# Patient Record
Sex: FEMALE | URBAN - METROPOLITAN AREA
[De-identification: names, ages, dates, MRNs, and addresses within clinical notes are randomized per-mention and may not be internally consistent; named-entity substitution may affect disease eponyms.]

---

## 2021-11-03 LAB
C. TRACHOMATIS, EXTERNAL RESULT: NEGATIVE
HEP B, EXTERNAL RESULT: NEGATIVE
HIV, EXTERNAL RESULT: NON REACTIVE
N. GONORRHOEAE, EXTERNAL RESULT: NEGATIVE
RPR, EXTERNAL RESULT: NON REACTIVE
RUBELLA TITER, EXTERNAL RESULT: NORMAL

## 2022-04-29 LAB — GBS, EXTERNAL RESULT: NEGATIVE

## 2022-05-16 ENCOUNTER — ANESTHESIA (OUTPATIENT)
Dept: OPERATING ROOM | Age: 32
End: 2022-05-16
Payer: COMMERCIAL

## 2022-05-16 ENCOUNTER — ANESTHESIA EVENT (OUTPATIENT)
Dept: LABOR AND DELIVERY | Age: 32
End: 2022-05-16

## 2022-05-16 ENCOUNTER — ANESTHESIA EVENT (OUTPATIENT)
Dept: OPERATING ROOM | Age: 32
End: 2022-05-16
Payer: COMMERCIAL

## 2022-05-16 ENCOUNTER — APPOINTMENT (OUTPATIENT)
Dept: LABOR AND DELIVERY | Age: 32
End: 2022-05-16
Payer: COMMERCIAL

## 2022-05-16 ENCOUNTER — ANESTHESIA (OUTPATIENT)
Dept: LABOR AND DELIVERY | Age: 32
End: 2022-05-16

## 2022-05-16 ENCOUNTER — HOSPITAL ENCOUNTER (INPATIENT)
Age: 32
LOS: 2 days | Discharge: HOME | End: 2022-05-18
Attending: OBSTETRICS & GYNECOLOGY | Admitting: OBSTETRICS & GYNECOLOGY
Payer: COMMERCIAL

## 2022-05-16 PROBLEM — Z78.9 ADMITTED TO LABOR AND DELIVERY: Status: ACTIVE | Noted: 2022-05-16

## 2022-05-16 LAB
ABO/RH: NORMAL
AMPHETAMINE SCREEN, URINE: NORMAL
ANTIBODY SCREEN: NORMAL
BACTERIA: ABNORMAL /HPF
BARBITURATE SCREEN URINE: NORMAL
BASOPHILS ABSOLUTE: 0 K/UL (ref 0–0.2)
BASOPHILS RELATIVE PERCENT: 0.2 %
BENZODIAZEPINE SCREEN, URINE: NORMAL
BILIRUBIN URINE: NEGATIVE
BLOOD, URINE: NEGATIVE
CANNABINOID SCREEN URINE: NORMAL
CLARITY: CLEAR
COCAINE METABOLITE SCREEN URINE: NORMAL
COLOR: YELLOW
EOSINOPHILS ABSOLUTE: 0.1 K/UL (ref 0–0.7)
EOSINOPHILS RELATIVE PERCENT: 0.7 %
EPITHELIAL CELLS, UA: ABNORMAL /HPF (ref 0–5)
GLUCOSE URINE: NEGATIVE MG/DL
HCT VFR BLD CALC: 33.5 % (ref 37–47)
HEMOGLOBIN: 11 G/DL (ref 12–16)
HEPATITIS B SURFACE ANTIGEN INTERPRETATION: NORMAL
HYALINE CASTS: ABNORMAL /HPF (ref 0–5)
KETONES, URINE: 40 MG/DL
LEUKOCYTE ESTERASE, URINE: ABNORMAL
LYMPHOCYTES ABSOLUTE: 0.9 K/UL (ref 1–4.8)
LYMPHOCYTES RELATIVE PERCENT: 11.1 %
Lab: NORMAL
MCH RBC QN AUTO: 30.4 PG (ref 27–31.3)
MCHC RBC AUTO-ENTMCNC: 33 % (ref 33–37)
MCV RBC AUTO: 92.2 FL (ref 82–100)
METHADONE SCREEN, URINE: NORMAL
MONOCYTES ABSOLUTE: 0.5 K/UL (ref 0.2–0.8)
MONOCYTES RELATIVE PERCENT: 6.2 %
NEUTROPHILS ABSOLUTE: 6.9 K/UL (ref 1.4–6.5)
NEUTROPHILS RELATIVE PERCENT: 81.8 %
NITRITE, URINE: NEGATIVE
OPIATE SCREEN URINE: NORMAL
OXYCODONE URINE: NORMAL
PDW BLD-RTO: 13.4 % (ref 11.5–14.5)
PH UA: 7 (ref 5–9)
PHENCYCLIDINE SCREEN URINE: NORMAL
PLATELET # BLD: 195 K/UL (ref 130–400)
PROPOXYPHENE SCREEN: NORMAL
PROTEIN UA: NEGATIVE MG/DL
RBC # BLD: 3.63 M/UL (ref 4.2–5.4)
RBC UA: ABNORMAL /HPF (ref 0–5)
SARS-COV-2, NAAT: NOT DETECTED
SPECIFIC GRAVITY UA: 1.02 (ref 1–1.03)
URINE REFLEX TO CULTURE: YES
UROBILINOGEN, URINE: 0.2 E.U./DL
WBC # BLD: 8.4 K/UL (ref 4.8–10.8)
WBC UA: ABNORMAL /HPF (ref 0–5)
YEAST: PRESENT /HPF

## 2022-05-16 PROCEDURE — 87086 URINE CULTURE/COLONY COUNT: CPT

## 2022-05-16 PROCEDURE — 80307 DRUG TEST PRSMV CHEM ANLYZR: CPT

## 2022-05-16 PROCEDURE — 6370000000 HC RX 637 (ALT 250 FOR IP): Performed by: OBSTETRICS & GYNECOLOGY

## 2022-05-16 PROCEDURE — 1220000000 HC SEMI PRIVATE OB R&B

## 2022-05-16 PROCEDURE — 6360000002 HC RX W HCPCS

## 2022-05-16 PROCEDURE — 2580000003 HC RX 258

## 2022-05-16 PROCEDURE — 81001 URINALYSIS AUTO W/SCOPE: CPT

## 2022-05-16 PROCEDURE — 87635 SARS-COV-2 COVID-19 AMP PRB: CPT

## 2022-05-16 PROCEDURE — 2580000003 HC RX 258: Performed by: OBSTETRICS & GYNECOLOGY

## 2022-05-16 PROCEDURE — 3E0P7VZ INTRODUCTION OF HORMONE INTO FEMALE REPRODUCTIVE, VIA NATURAL OR ARTIFICIAL OPENING: ICD-10-PCS | Performed by: OBSTETRICS & GYNECOLOGY

## 2022-05-16 PROCEDURE — 87340 HEPATITIS B SURFACE AG IA: CPT

## 2022-05-16 PROCEDURE — 86900 BLOOD TYPING SEROLOGIC ABO: CPT

## 2022-05-16 PROCEDURE — 85025 COMPLETE CBC W/AUTO DIFF WBC: CPT

## 2022-05-16 PROCEDURE — 99024 POSTOP FOLLOW-UP VISIT: CPT | Performed by: OBSTETRICS & GYNECOLOGY

## 2022-05-16 PROCEDURE — 3E033VJ INTRODUCTION OF OTHER HORMONE INTO PERIPHERAL VEIN, PERCUTANEOUS APPROACH: ICD-10-PCS | Performed by: OBSTETRICS & GYNECOLOGY

## 2022-05-16 PROCEDURE — 2500000003 HC RX 250 WO HCPCS

## 2022-05-16 PROCEDURE — 86901 BLOOD TYPING SEROLOGIC RH(D): CPT

## 2022-05-16 PROCEDURE — 6360000002 HC RX W HCPCS: Performed by: OBSTETRICS & GYNECOLOGY

## 2022-05-16 PROCEDURE — 86592 SYPHILIS TEST NON-TREP QUAL: CPT

## 2022-05-16 PROCEDURE — 86850 RBC ANTIBODY SCREEN: CPT

## 2022-05-16 RX ORDER — SODIUM CHLORIDE, SODIUM LACTATE, POTASSIUM CHLORIDE, AND CALCIUM CHLORIDE .6; .31; .03; .02 G/100ML; G/100ML; G/100ML; G/100ML
500 INJECTION, SOLUTION INTRAVENOUS PRN
Status: DISCONTINUED | OUTPATIENT
Start: 2022-05-16 | End: 2022-05-18 | Stop reason: HOSPADM

## 2022-05-16 RX ORDER — ONDANSETRON 2 MG/ML
4 INJECTION INTRAMUSCULAR; INTRAVENOUS EVERY 6 HOURS PRN
Status: DISCONTINUED | OUTPATIENT
Start: 2022-05-16 | End: 2022-05-18 | Stop reason: HOSPADM

## 2022-05-16 RX ORDER — OXYTOCIN 10 [USP'U]/ML
INJECTION, SOLUTION INTRAMUSCULAR; INTRAVENOUS
Status: DISPENSED
Start: 2022-05-16 | End: 2022-05-17

## 2022-05-16 RX ORDER — KETOROLAC TROMETHAMINE 30 MG/ML
30 INJECTION, SOLUTION INTRAMUSCULAR; INTRAVENOUS EVERY 6 HOURS PRN
Status: DISCONTINUED | OUTPATIENT
Start: 2022-05-16 | End: 2022-05-18 | Stop reason: HOSPADM

## 2022-05-16 RX ORDER — SODIUM CHLORIDE 9 MG/ML
25 INJECTION, SOLUTION INTRAVENOUS PRN
Status: DISCONTINUED | OUTPATIENT
Start: 2022-05-16 | End: 2022-05-18 | Stop reason: HOSPADM

## 2022-05-16 RX ORDER — PENICILLIN G 3000000 [IU]/50ML
3 INJECTION, SOLUTION INTRAVENOUS EVERY 4 HOURS
Status: DISCONTINUED | OUTPATIENT
Start: 2022-05-16 | End: 2022-05-16

## 2022-05-16 RX ORDER — DIPHENHYDRAMINE HCL 25 MG
25 TABLET ORAL EVERY 4 HOURS PRN
Status: DISCONTINUED | OUTPATIENT
Start: 2022-05-16 | End: 2022-05-18 | Stop reason: HOSPADM

## 2022-05-16 RX ORDER — SERTRALINE HYDROCHLORIDE 25 MG/1
25 TABLET, FILM COATED ORAL DAILY
COMMUNITY

## 2022-05-16 RX ORDER — SERTRALINE HYDROCHLORIDE 25 MG/1
25 TABLET, FILM COATED ORAL DAILY
Status: DISCONTINUED | OUTPATIENT
Start: 2022-05-17 | End: 2022-05-18 | Stop reason: HOSPADM

## 2022-05-16 RX ORDER — NALBUPHINE HCL 10 MG/ML
10 AMPUL (ML) INJECTION
Status: DISCONTINUED | OUTPATIENT
Start: 2022-05-16 | End: 2022-05-18 | Stop reason: RX

## 2022-05-16 RX ORDER — ACETAMINOPHEN 325 MG/1
650 TABLET ORAL EVERY 4 HOURS PRN
Status: DISCONTINUED | OUTPATIENT
Start: 2022-05-16 | End: 2022-05-18 | Stop reason: HOSPADM

## 2022-05-16 RX ORDER — IBUPROFEN 800 MG/1
800 TABLET ORAL EVERY 6 HOURS PRN
Status: DISCONTINUED | OUTPATIENT
Start: 2022-05-16 | End: 2022-05-18 | Stop reason: HOSPADM

## 2022-05-16 RX ORDER — MISOPROSTOL 200 UG/1
TABLET ORAL
Status: DISPENSED
Start: 2022-05-16 | End: 2022-05-17

## 2022-05-16 RX ORDER — SODIUM CHLORIDE 9 MG/ML
INJECTION, SOLUTION INTRAVENOUS
Status: COMPLETED
Start: 2022-05-16 | End: 2022-05-16

## 2022-05-16 RX ORDER — MISOPROSTOL 200 UG/1
600 TABLET ORAL ONCE
Status: COMPLETED | OUTPATIENT
Start: 2022-05-16 | End: 2022-05-16

## 2022-05-16 RX ORDER — SODIUM CHLORIDE, SODIUM LACTATE, POTASSIUM CHLORIDE, CALCIUM CHLORIDE 600; 310; 30; 20 MG/100ML; MG/100ML; MG/100ML; MG/100ML
INJECTION, SOLUTION INTRAVENOUS CONTINUOUS
Status: DISCONTINUED | OUTPATIENT
Start: 2022-05-16 | End: 2022-05-18 | Stop reason: HOSPADM

## 2022-05-16 RX ORDER — DOCUSATE SODIUM 100 MG/1
100 CAPSULE, LIQUID FILLED ORAL 2 TIMES DAILY PRN
Status: DISCONTINUED | OUTPATIENT
Start: 2022-05-16 | End: 2022-05-18 | Stop reason: HOSPADM

## 2022-05-16 RX ORDER — SODIUM CHLORIDE, SODIUM LACTATE, POTASSIUM CHLORIDE, AND CALCIUM CHLORIDE .6; .31; .03; .02 G/100ML; G/100ML; G/100ML; G/100ML
1000 INJECTION, SOLUTION INTRAVENOUS PRN
Status: DISCONTINUED | OUTPATIENT
Start: 2022-05-16 | End: 2022-05-18 | Stop reason: HOSPADM

## 2022-05-16 RX ORDER — NALBUPHINE HCL 10 MG/ML
10 AMPUL (ML) INJECTION
Status: DISCONTINUED | OUTPATIENT
Start: 2022-05-16 | End: 2022-05-16 | Stop reason: SDUPTHER

## 2022-05-16 RX ORDER — NALOXONE HYDROCHLORIDE 0.4 MG/ML
INJECTION, SOLUTION INTRAMUSCULAR; INTRAVENOUS; SUBCUTANEOUS PRN
Status: DISCONTINUED | OUTPATIENT
Start: 2022-05-16 | End: 2022-05-18 | Stop reason: HOSPADM

## 2022-05-16 RX ORDER — SODIUM CHLORIDE 0.9 % (FLUSH) 0.9 %
5-40 SYRINGE (ML) INJECTION PRN
Status: DISCONTINUED | OUTPATIENT
Start: 2022-05-16 | End: 2022-05-18 | Stop reason: HOSPADM

## 2022-05-16 RX ORDER — OXYTOCIN 10 [USP'U]/ML
10 INJECTION, SOLUTION INTRAMUSCULAR; INTRAVENOUS ONCE
Status: DISCONTINUED | OUTPATIENT
Start: 2022-05-16 | End: 2022-05-18 | Stop reason: HOSPADM

## 2022-05-16 RX ORDER — OXYTOCIN 10 [USP'U]/ML
INJECTION, SOLUTION INTRAMUSCULAR; INTRAVENOUS
Status: COMPLETED
Start: 2022-05-16 | End: 2022-05-16

## 2022-05-16 RX ORDER — TRANEXAMIC ACID 100 MG/ML
INJECTION, SOLUTION INTRAVENOUS
Status: COMPLETED
Start: 2022-05-16 | End: 2022-05-16

## 2022-05-16 RX ADMIN — NALBUPHINE HYDROCHLORIDE 10 MG: 10 INJECTION, SOLUTION INTRAMUSCULAR; INTRAVENOUS; SUBCUTANEOUS at 17:14

## 2022-05-16 RX ADMIN — MISOPROSTOL 600 MCG: 200 TABLET ORAL at 18:46

## 2022-05-16 RX ADMIN — Medication 87.3 MILLI-UNITS/MIN: at 16:33

## 2022-05-16 RX ADMIN — ACETAMINOPHEN 650 MG: 325 TABLET ORAL at 12:57

## 2022-05-16 RX ADMIN — Medication 1 MILLI-UNITS/MIN: at 09:45

## 2022-05-16 RX ADMIN — SODIUM CHLORIDE, POTASSIUM CHLORIDE, SODIUM LACTATE AND CALCIUM CHLORIDE 999 ML: 600; 310; 30; 20 INJECTION, SOLUTION INTRAVENOUS at 16:31

## 2022-05-16 RX ADMIN — KETOROLAC TROMETHAMINE 30 MG: 30 INJECTION, SOLUTION INTRAMUSCULAR at 19:01

## 2022-05-16 RX ADMIN — TRANEXAMIC ACID 1000 MG: 100 INJECTION, SOLUTION INTRAVENOUS at 18:52

## 2022-05-16 RX ADMIN — OXYTOCIN 10 UNITS: 10 INJECTION, SOLUTION INTRAMUSCULAR; INTRAVENOUS at 18:10

## 2022-05-16 RX ADMIN — SODIUM CHLORIDE, POTASSIUM CHLORIDE, SODIUM LACTATE AND CALCIUM CHLORIDE 999 ML: 600; 310; 30; 20 INJECTION, SOLUTION INTRAVENOUS at 09:45

## 2022-05-16 RX ADMIN — SODIUM CHLORIDE 100 ML: 9 INJECTION, SOLUTION INTRAVENOUS at 18:54

## 2022-05-16 ASSESSMENT — PAIN DESCRIPTION - LOCATION
LOCATION: VAGINA;BUTTOCKS
LOCATION: VAGINA

## 2022-05-16 ASSESSMENT — PAIN DESCRIPTION - DESCRIPTORS
DESCRIPTORS: THROBBING
DESCRIPTORS: ACHING

## 2022-05-16 ASSESSMENT — PAIN SCALES - GENERAL
PAINLEVEL_OUTOF10: 5
PAINLEVEL_OUTOF10: 1

## 2022-05-16 ASSESSMENT — PAIN DESCRIPTION - ORIENTATION: ORIENTATION: LOWER

## 2022-05-16 NOTE — FLOWSHEET NOTE
Dr. Diandra Gallego updated. SVE performed 4cm, -1, 90% effaced. Nubain given, consents signed for epidural.  Informs once epidural administered perform SVE.

## 2022-05-16 NOTE — FLOWSHEET NOTE
Vitals performed, continuous pulse ox  started due to Pulse above 105. Pitocin started 1mu. LR running at 125mL/hr  Patient tolerated well.

## 2022-05-16 NOTE — FLOWSHEET NOTE
CRNA in to discuss epidural consents.   -Nubain given  -LR bolus infusing Sarecycline Counseling: Patient advised regarding possible photosensitivity and discoloration of the teeth, skin, lips, tongue and gums.  Patient instructed to avoid sunlight, if possible.  When exposed to sunlight, patients should wear protective clothing, sunglasses, and sunscreen.  The patient was instructed to call the office immediately if the following severe adverse effects occur:  hearing changes, easy bruising/bleeding, severe headache, or vision changes.  The patient verbalized understanding of the proper use and possible adverse effects of sarecycline.  All of the patient's questions and concerns were addressed.

## 2022-05-16 NOTE — L&D DELIVERY SUMMARY NOTE
Department of Obstetrics and Gynecology  Spontaneous Vaginal Delivery Note      Pre-operative Diagnosis:  Term IUP @ 39 weeks, Elective Induction    Post-operative Diagnosis:  Term IUP @ 39 weeks, s/p     Information for the patient's :  Stephanie Colette [[id number]]                    Infant Wt: 3635 grams (8lbs, 0.2 oz)  Information for the patient's :  Stephanieernesto Alvarenga [[id number]]           APGARS:   9/9  Information for the patient's :  Stephanieernesto Alvarenga [[id number]]           Anesthesia: none        Delivery Summary:  At  @ 1800, this 27 yo G2, now P5 female delivered vaginally without anesthesia. Infant was suction with bulb at delivery. Nuchal cord x 2 was reduced. The infant was placed on the patient's abdomen after delivery. The cord was clamped and cut, after 60 second delay. Cord blood was obtained. Placenta delivered intact with normal 3 vessel cord. The fundus was firm with massage and IV as well as IM Pitocin. There were no cervical, vaginal, or perineal lacerations. Female Infant weighs  3565 grams  (8lbs,0.2 oz) with Apgars scores of 9 at 1 minute and 9 at 5 minutes (9/9). EBL ml. Both mom and infant are recovering well. All instruments, needled and sponges were counted and found to be correct x 2.         Specimen:  none     Intake/Output:     Date 05/15/22 0701 - 22 07(Not Admitted) 22 0701 - 22 0700   Shift 7399-3174 8888-1030 24 Hour Total 1029-9642 2500-2596 24 Hour Total   INTAKE   I.V.    34.1  34.1   Shift Total    34.1  34.1   OUTPUT   Blood    75  75     Quantitative Blood Loss (mL)    75  75   Shift Total    75  75   NET    -40.9  -40.9       Condition:  stable    Blood Type and Rh: O POS        Rubella Immunity Status:  Immune          Infant Feeding:   breast

## 2022-05-16 NOTE — FLOWSHEET NOTE
Called and spoke to Dr. Sweta Dumont to update on patient arrive for induction. Patient is multip at 39 weeks. . SVE 1cm dilated. Patient Arrived with  Enrico Moritz.  Informs routine orders for induction with Pitocin.   -Any pain medication patient would like Nubain or epidural.  -Ok to eat until 4-5cm.

## 2022-05-16 NOTE — ANESTHESIA PRE PROCEDURE
Department of Anesthesiology  Preprocedure Note       Name:  Ronald Notice   Age:  28 y.o.  :  1990                                          MRN:  [id number]         Date:  2022      Surgeon: * No surgeons listed *    Procedure: * No procedures listed *    Medications prior to admission:   Prior to Admission medications    Medication Sig Start Date End Date Taking?  Authorizing Provider   sertraline (ZOLOFT) 25 MG tablet Take 25 mg by mouth daily   Yes Historical Provider, MD       Current medications:    Current Facility-Administered Medications   Medication Dose Route Frequency Provider Last Rate Last Admin    oxytocin (PITOCIN) 30 units in 500 mL infusion  1 stacy-units/min IntraVENous Continuous Sravani Bartholomew MD 14 mL/hr at 22 1545 14 stacy-units/min at 22 1545    lactated ringers infusion   IntraVENous Continuous Sravani Bartholomew  mL/hr at 22 1631 999 mL at 22 1631    lactated ringers bolus  500 mL IntraVENous PRN Sravani Bartholomew MD        Or    lactated ringers bolus  1,000 mL IntraVENous PRN Sravani Bartholomew MD        sodium chloride flush 0.9 % injection 5-40 mL  5-40 mL IntraVENous PRN Sravani Bartholomew MD        0.9 % sodium chloride infusion  25 mL IntraVENous PRN Sravani Bartholomew MD        ondansetron [facility]) injection 4 mg  4 mg IntraVENous Q6H PRN Sravani Bartholomew MD        diphenhydrAMINE (BENADRYL) tablet 25 mg  25 mg Oral Q4H PRN Sravani Bartholomew MD        acetaminophen (TYLENOL) tablet 650 mg  650 mg Oral Q4H PRN Sravani Bartholomew MD   650 mg at 22 1257    benzocaine-menthol (DERMOPLAST) 20-0.5 % spray   Topical PRN Sravani Bartholomew MD        docusate sodium (COLACE) capsule 100 mg  100 mg Oral BID PRN Sravani Bartholomew MD        penicillin G potassium 5 Million Units in dextrose 5 % 100 mL IVPB (mini-bag)  5 Million Units IntraVENous Once Sravani Bartholomew MD        Followed by   [facility] penicillin G potassium IVPB 3 Million Units  3 Million Units IntraVENous Q4H Soto Campos MD        nalbuphine (NUBAIN) injection 10 mg  10 mg IntraVENous Q3H PRN Jenni Davis MD   10 mg at 05/16/22 1714    naloxone 0.4 mg in 10 mL sodium chloride syringe   IntraVENous PRN Soto Campos MD        fentaNYL 125 mcg, ropivacaine 0.2% in sodium chloride 0.9% 127.5ml (OB) epidural  12 mL/hr Epidural Continuous Soto Campos MD           Allergies: Allergies   Allergen Reactions    Pcn [Penicillins]        Problem List:    Patient Active Problem List   Diagnosis Code    Admitted to labor and delivery Z78.9       Past Medical History:  No past medical history on file. Past Surgical History:  No past surgical history on file. Social History:    Social History     Tobacco Use    Smoking status: Never Smoker    Smokeless tobacco: Never Used   Substance Use Topics    Alcohol use: Not Currently                                Counseling given: Not Answered      Vital Signs (Current):   Vitals:    05/16/22 1251 05/16/22 1400 05/16/22 1532 05/16/22 1636   BP: 118/75 116/64 116/74 121/72   Pulse: 101 99 90 90   Resp:  16 16 16   Temp:   36.8 °C (98.2 °F) 36.7 °C (98.1 °F)   TempSrc:   Oral Oral   SpO2:  98% 97% 99%   Weight:       Height:                                                  BP Readings from Last 3 Encounters:   05/16/22 121/72       NPO Status:                                                                                 BMI:   Wt Readings from Last 3 Encounters:   05/16/22 163 lb 3.2 oz (74 kg)     Body mass index is 26.34 kg/m².     CBC:   Lab Results   Component Value Date    WBC 8.4 05/16/2022    RBC 3.63 05/16/2022    HGB 11.0 05/16/2022    HCT 33.5 05/16/2022    MCV 92.2 05/16/2022    RDW 13.4 05/16/2022     05/16/2022       CMP: No results found for: NA, K, CL, CO2, BUN, CREATININE, GFRAA, AGRATIO, LABGLOM, GLUCOSE, GLU, PROT, CALCIUM, BILITOT, ALKPHOS, AST, ALT    POC Tests: No results for input(s): POCGLU, POCNA, POCK, POCCL, POCBUN, POCHEMO, POCHCT in the last 72 hours. Coags: No results found for: PROTIME, INR, APTT    HCG (If Applicable): No results found for: PREGTESTUR, PREGSERUM, HCG, HCGQUANT     ABGs: No results found for: PHART, PO2ART, KCN6AUN, OVP9TZC, BEART, G6OOSQJN     Type & Screen (If Applicable):  No results found for: LABABO, LABRH    Drug/Infectious Status (If Applicable):  No results found for: HIV, HEPCAB    COVID-19 Screening (If Applicable):   Lab Results   Component Value Date    COVID19 Not Detected 05/16/2022           Anesthesia Evaluation  Patient summary reviewed and Nursing notes reviewed no history of anesthetic complications:   Airway: Mallampati: II       Mouth opening: > = 3 FB Dental: normal exam         Pulmonary:Negative Pulmonary ROS and normal exam                               Cardiovascular:Negative CV ROS                      Neuro/Psych:   (+) psychiatric history:depression/anxiety             GI/Hepatic/Renal: Neg GI/Hepatic/Renal ROS            Endo/Other: Negative Endo/Other ROS                    Abdominal:             Vascular: negative vascular ROS. Other Findings:             Anesthesia Plan      epidural     ASA 2           MIPS: Prophylactic antiemetics administered. Anesthetic plan and risks discussed with patient. Plan discussed with attending.                   SHERITA Menard - CRNA   5/16/2022

## 2022-05-16 NOTE — PROGRESS NOTES
Department of Obstetrics and Gynecology  Labor and Delivery  Gary Mckeon MD: Post Partum Progress Note      SUBJECTIVE: In to see evaluation regarding increased postpartum bleeding during nursing assessment. She is s/p Pitocin IV as well as IM Pitocin. Patient is uncomfortable, but tolerating fundal massage. OBJECTIVE:      ROS:   Abdomen: Cramping  Pelvis: increased bleeding  Rest of systems reviewed and found to be negative    Vitals:  BP (!) 113/59   Pulse 93   Temp 97.7 °F (36.5 °C)   Resp 20   Ht 5' 6\" (1.676 m)   Wt 163 lb 3.2 oz (74 kg)   SpO2 100%   BMI 26.34 kg/m²     PE:   Gen: AxO x 3, in Mild Distress  CV: RRR,   Lungs: CTAB  Abdomen: Appropriately tender, Uterus firm @ U-2   Pelvis:Moderate amount of Blood/clot in Vaginal Vault      ASSESSMENT & PLAN:    Assessment:   S/p -with PPH secondary to uterine atony. Plan:   Uterine massage evacuated ~250cc of blood/clot, then bladder was emptied. 600mcg of Cytotec was placed rectally and 1 dose of TMX was given IV over 15 minutes. Toradol 30mg IV was given for pain. Will continue to monitor with pad counts, uterine massage. Current EBL 557cc.

## 2022-05-16 NOTE — FLOWSHEET NOTE
Update to Dr. Darrian Casey. Inform patient Pitocin infusing at 4mu. Increasing to 6mu shortly and patient tolerating well. No new orders received. Dr. Darrian Casey aware pitocin shut off briefly due to unit acuity.

## 2022-05-17 PROBLEM — Z78.9 ADMITTED TO LABOR AND DELIVERY: Status: RESOLVED | Noted: 2022-05-16 | Resolved: 2022-05-17

## 2022-05-17 LAB
RPR: NORMAL
URINE CULTURE, ROUTINE: NORMAL

## 2022-05-17 PROCEDURE — 1220000000 HC SEMI PRIVATE OB R&B

## 2022-05-17 PROCEDURE — 6370000000 HC RX 637 (ALT 250 FOR IP): Performed by: OBSTETRICS & GYNECOLOGY

## 2022-05-17 RX ORDER — IBUPROFEN 800 MG/1
800 TABLET ORAL EVERY 6 HOURS PRN
Qty: 120 TABLET | Refills: 3 | Status: SHIPPED | OUTPATIENT
Start: 2022-05-17

## 2022-05-17 RX ADMIN — IBUPROFEN 800 MG: 800 TABLET, FILM COATED ORAL at 21:52

## 2022-05-17 RX ADMIN — ACETAMINOPHEN 650 MG: 325 TABLET ORAL at 11:42

## 2022-05-17 RX ADMIN — IBUPROFEN 800 MG: 800 TABLET, FILM COATED ORAL at 16:11

## 2022-05-17 RX ADMIN — ACETAMINOPHEN 650 MG: 325 TABLET ORAL at 20:04

## 2022-05-17 RX ADMIN — SERTRALINE HYDROCHLORIDE 25 MG: 25 TABLET ORAL at 07:55

## 2022-05-17 RX ADMIN — IBUPROFEN 800 MG: 800 TABLET, FILM COATED ORAL at 07:55

## 2022-05-17 RX ADMIN — ACETAMINOPHEN 650 MG: 325 TABLET ORAL at 00:00

## 2022-05-17 RX ADMIN — IBUPROFEN 800 MG: 800 TABLET, FILM COATED ORAL at 02:12

## 2022-05-17 ASSESSMENT — PAIN SCALES - GENERAL
PAINLEVEL_OUTOF10: 2
PAINLEVEL_OUTOF10: 2
PAINLEVEL_OUTOF10: 1
PAINLEVEL_OUTOF10: 1
PAINLEVEL_OUTOF10: 2
PAINLEVEL_OUTOF10: 3
PAINLEVEL_OUTOF10: 2

## 2022-05-17 ASSESSMENT — PAIN DESCRIPTION - DESCRIPTORS
DESCRIPTORS: CRAMPING

## 2022-05-17 ASSESSMENT — PAIN DESCRIPTION - LOCATION
LOCATION: ABDOMEN
LOCATION: HEAD
LOCATION: ABDOMEN
LOCATION: ABDOMEN

## 2022-05-17 NOTE — H&P
Department of Obstetrics and Gynecology  Attending Obstetrics History and Physical        CHIEF COMPLAINT:  Induction of labor    HISTORY OF PRESENT ILLNESS:      The patient is a 28 y.o.  2 parity 1 at 43 weeks. Patient presents with a chief complaint as above and is being admitted for induction    DATES:    Last Menstrual Period:    Estimated Due Date:      PRENATAL CARE:    Provider:  guille    Blood Type/Rh:    Antibody Screen:    Rubella:    RPR:    Hepatitis B Surface Antigen:   HIV:    Gonorrhea:    Chlamydia:    MSAFP/Multiple Markers:  Date:  ; Results:    U/S Structural Survery:    1 hour Glucose Tolerance Test:    Group B Strep:        PAST OB HISTORY        Depression:  No      Post-partum depression:  No      Diabetes:  No      Gestational Diabetes:  No      Thyroid Disease:  No      Chronic HTN:  No      Gestation HTN:  No      Pre-eclampsia:  No      Seizure disorder:  No      Asthma:  No      Clotting disorder:  No      :  No      Tubal ligation:  No      D & C:  No      Cerclage:  No      LEEP:  No      Myomectomy:  No    OB History    Para Term  AB Living   2 2 2     2   SAB IAB Ectopic Molar Multiple Live Births           0 1      # Outcome Date GA Lbr Ken/2nd Weight Sex Delivery Anes PTL Lv   2 Term 22 39w2d / 00:01 8 lb 0.2 oz (3.635 kg) F Vag-Spont None N ROYAL      Complications: Cord around body   1 Term              Past Gynecological History:      Menarche:  12  Last menstrual period:  No LMP recorded. History of uterine fibroids:  No  History of endometriosis:  No  Pap History:  Last PAP was normal; 2021. Sexually transmitted disease history: none    Past Medical History:    No past medical history on file. Past Surgical History:    No past surgical history on file. Social History:        Family History:   No family history on file.   Medications Prior to Admission:  Medications Prior to Admission: sertraline (ZOLOFT) 25 MG tablet, Take 25 mg by mouth daily  Allergies:  Pcn [penicillins]    REVIEW OF SYSTEMS:    Patient has no history of depression. Patient has no symptoms of depression      PHYSICAL EXAM:    General appearance:  awake, alert, cooperative, no apparent distress, and appears stated age  Neurologic:  Awake, alert, oriented to name, place and time. Cranial nerves II-XII are grossly intact. Motor is 5 out of 5 bilaterally. Cerebellar finger to nose, heel to shin intact. Sensory is intact.   Babinski down going, Romberg negative, and gait is normal.  Lungs:  No increased work of breathing, good air exchange, clear to auscultation bilaterally, no crackles or wheezing  Heart:  Normal apical impulse, regular rate and rhythm, normal S1 and S2, no S3 or S4, and no murmur noted  Abdomen:  No scars, normal bowel sounds, soft, non-distended, non-tender, no masses palpated, no hepatosplenomegally  Fetal heart rate:  Baseline Heart Rate 144, accelerations:  present  Pelvis:  External Genitalia: General appearance; normal, Hair distribution; normal, Lesions absent  Cervix:    DILATION:  1 cm  EFFACEMENT:   50%  STATION:  -3 cm  CONSISTENCY:  medium  POSITION:  mid  BISHOPS SCORE:      Contraction frequency:  0 minutes  Membranes:  Intact    Pelvic Ultrasound:      General Labs:  VDRL:  No components found for: CVDRL    ASSESSMENT AND PLAN:    The patient is a 28 y.o.  2 parity 1 at 44 weeks    Principal Problem:    Admitted to labor and delivery  Plan: induction of labor

## 2022-05-17 NOTE — PLAN OF CARE
Problem: Pain  Goal: Verbalizes/displays adequate comfort level or baseline comfort level  5/16/2022 2253 by Melany Silverman RN  Outcome: Progressing  5/16/2022 1647 by Jarrell Young RN  Outcome: Progressing  Flowsheets  Taken 5/16/2022 0941  Verbalizes/displays adequate comfort level or baseline comfort level:   Assess pain using appropriate pain scale   Encourage patient to monitor pain and request assistance  Taken 5/16/2022 0805  Verbalizes/displays adequate comfort level or baseline comfort level:   Encourage patient to monitor pain and request assistance   Assess pain using appropriate pain scale     Problem: Infection - Adult  Goal: Absence of infection at discharge  5/16/2022 2253 by Melany Silverman RN  Outcome: Progressing  5/16/2022 1647 by Jarrell Young RN  Outcome: Progressing  Goal: Absence of infection during hospitalization  5/16/2022 2253 by Melany Silverman RN  Outcome: Progressing  5/16/2022 1647 by Jarrell Young RN  Outcome: Progressing  Goal: Absence of fever/infection during anticipated neutropenic period  5/16/2022 2253 by Melany Silverman RN  Outcome: Progressing  5/16/2022 1647 by Jarrell Young RN  Outcome: Progressing     Problem: Safety - Adult  Goal: Free from fall injury  5/16/2022 2253 by Melany Silverman RN  Outcome: Progressing  5/16/2022 1647 by Jarrell Young RN  Outcome: Progressing     Problem: Discharge Planning  Goal: Discharge to home or other facility with appropriate resources  5/16/2022 2253 by Melany Silverman RN  Outcome: Progressing  5/16/2022 1647 by Jarrell Young RN  Outcome: Progressing     Problem: Skin/Tissue Integrity  Goal: Absence of new skin breakdown  Description: 1. Monitor for areas of redness and/or skin breakdown  2. Assess vascular access sites hourly  3. Every 4-6 hours minimum:  Change oxygen saturation probe site  4.   Every 4-6 hours:  If on nasal continuous positive airway pressure, respiratory therapy assess nares and determine need for appliance change or resting period.   Outcome: Progressing

## 2022-05-17 NOTE — PROGRESS NOTES
Subjective:       28 y.o.  [id number] @ 39w2d    Postpartum Day 1: Vaginal Delivery on     The patient feels well. The patient denies emotional concerns. Pain is well controlled with current medications. The baby iswell. Baby is feeding via breast. The patient is ambulating well. The patient is tolerating a normal diet. Objective:      Patient Vitals for the past 8 hrs:   BP Temp Temp src Pulse Resp SpO2   05/17/22 0736 110/67 97.4 °F (36.3 °C) Oral 81 16 97 %     General:    alert, appears stated age and cooperative   Bowel Sounds:  active   Lochia:  appropriate   Uterine Fundus:   Firm @ U-2   Perineum:  Intact   DVT Evaluation:  No evidence of DVT seen on physical exam.         Assessment:     Status post Vaginal Delivery. Plan: 1. Routine postpartum care.   2. Discharge Planning initiated      Chucho Saldivar MD  May 17, 2022

## 2022-05-17 NOTE — LACTATION NOTE
This note was copied from a baby's chart. LC in for initial consult: Written information and [facility] resources reviewed. - Mother reports infant fed at left, is sleepy now. - Use of hand expression and skin-to-skin reviewed with mother now. - Mother able to easily hand express colostrum.  - Infant latches easily, mother denies pain. - Peds provider in, reports assessment of sl. 'tongue tie', LC assessed infant to have slightly restricted posterior lingual frenulum with small dimple at tip of tongue.  - Mother educated on signs and symptoms to monitor for that would indicate need for further follow-up per frenulum restriction.   - Mother reports have a breast pump at home for use as needed. - Since birth infant has had 4 voids, 3 stools, 8 breast feeds, no re-weight since birth. - Family considering discharge today, 24 hrs postpartum. - Importance of frequent feeds, on infant demand, at least 8 - 12 times in 24 hours for at least 15 - 20 minutes, and expectations with voids/stools in the next 24 - 48 hrs, and need to schedule with follow-up peds provider within 24 - 48 hrs of discharge. - Family verbalizes understanding, deny additional questions at this time. - [facility] team to continue to follow as needed.

## 2022-05-17 NOTE — FLOWSHEET NOTE
Per Dr. Refugio Meier aware QBL greater than 500. Patient stable at this time. Per Dr. Refugio Meier no need to call [facility].

## 2022-05-17 NOTE — PLAN OF CARE
Problem: Pain  Goal: Verbalizes/displays adequate comfort level or baseline comfort level  2022 0843 by Leonor Bland RN  Outcome: Progressing  Flowsheets (Taken 2022 0336)  Verbalizes/displays adequate comfort level or baseline comfort level: Assess pain using appropriate pain scale  2022 by Yelena Araiza RN  Outcome: Progressing     Problem: Vaginal Birth or  Section  Goal: Fetal and maternal status remain reassuring during the birth process  Description:  Birth OB-Pregnancy care plan goal which identifies if the fetal and maternal status remain reassuring during the birth process  2022 by Yelena Araiza RN  Outcome: Completed     Problem: Infection - Adult  Goal: Absence of infection at discharge  2022 0843 by Leonor Bland RN  Outcome: Progressing  2022 by Yelena Araiza RN  Outcome: Progressing  Goal: Absence of infection during hospitalization  2022 0843 by Leonor Bland RN  Outcome: Progressing  2022 by Yelena Araiza RN  Outcome: Progressing  Goal: Absence of fever/infection during anticipated neutropenic period  2022 0843 by Leonor Bland RN  Outcome: Progressing  2022 by Yelena Araiza RN  Outcome: Progressing     Problem: Safety - Adult  Goal: Free from fall injury  2022 by Leonor Bland RN  Outcome: Progressing  2022 by Yelena Araiza RN  Outcome: Progressing     Problem: Discharge Planning  Goal: Discharge to home or other facility with appropriate resources  2022 08 by Leonor Bland RN  Outcome: Progressing  2022 by Yelena Araiza RN  Outcome: Progressing     Problem: Skin/Tissue Integrity  Goal: Absence of new skin breakdown  Description: 1. Monitor for areas of redness and/or skin breakdown  2. Assess vascular access sites hourly  3. Every 4-6 hours minimum:  Change oxygen saturation probe site  4.   Every 4-6 hours:  If on nasal continuous positive airway pressure, respiratory therapy assess nares and determine need for appliance change or resting period.   5/17/2022 0843 by Marily Mckeon RN  Outcome: Progressing  5/16/2022 2993 by Melany Silverman RN  Outcome: Progressing

## 2022-05-18 VITALS
HEART RATE: 74 BPM | WEIGHT: 163.2 LBS | DIASTOLIC BLOOD PRESSURE: 71 MMHG | OXYGEN SATURATION: 97 % | HEIGHT: 66 IN | RESPIRATION RATE: 18 BRPM | BODY MASS INDEX: 26.23 KG/M2 | TEMPERATURE: 97.3 F | SYSTOLIC BLOOD PRESSURE: 113 MMHG

## 2022-05-18 PROCEDURE — 7200000001 HC VAGINAL DELIVERY

## 2022-05-18 PROCEDURE — 6370000000 HC RX 637 (ALT 250 FOR IP): Performed by: OBSTETRICS & GYNECOLOGY

## 2022-05-18 RX ORDER — NALBUPHINE HYDROCHLORIDE 20 MG/ML
10 INJECTION, SOLUTION INTRAMUSCULAR; INTRAVENOUS; SUBCUTANEOUS
Status: DISCONTINUED | OUTPATIENT
Start: 2022-05-18 | End: 2022-05-18 | Stop reason: HOSPADM

## 2022-05-18 RX ADMIN — IBUPROFEN 800 MG: 800 TABLET, FILM COATED ORAL at 06:46

## 2022-05-18 RX ADMIN — SERTRALINE HYDROCHLORIDE 25 MG: 25 TABLET ORAL at 08:27

## 2022-05-18 ASSESSMENT — PAIN SCALES - GENERAL: PAINLEVEL_OUTOF10: 1

## 2022-05-18 NOTE — PLAN OF CARE
Problem: Pain  Goal: Verbalizes/displays adequate comfort level or baseline comfort level  5/17/2022 2010 by Karolina Pollack RN  Outcome: Progressing  5/17/2022 0843 by Gianni Stringer RN  Outcome: Progressing  Flowsheets (Taken 5/17/2022 0736)  Verbalizes/displays adequate comfort level or baseline comfort level: Assess pain using appropriate pain scale     Problem: Infection - Adult  Goal: Absence of infection at discharge  5/17/2022 2010 by Karolina Pollack RN  Outcome: Progressing  5/17/2022 0843 by iGanni Stringer RN  Outcome: Progressing  Goal: Absence of infection during hospitalization  5/17/2022 2010 by Karolina Pollack RN  Outcome: Progressing  5/17/2022 0843 by Gianni Stringer RN  Outcome: Progressing  Goal: Absence of fever/infection during anticipated neutropenic period  5/17/2022 2010 by Karolina Pollack RN  Outcome: Progressing  5/17/2022 0843 by Gianni Stringer RN  Outcome: Progressing     Problem: Safety - Adult  Goal: Free from fall injury  5/17/2022 2010 by Karolina Pollack RN  Outcome: Progressing  5/17/2022 0843 by Gianni Stringer RN  Outcome: Progressing     Problem: Discharge Planning  Goal: Discharge to home or other facility with appropriate resources  5/17/2022 2010 by Karolina Pollack RN  Outcome: Progressing  5/17/2022 0843 by Gianni Stringer RN  Outcome: Progressing     Problem: Skin/Tissue Integrity  Goal: Absence of new skin breakdown  Description: 1. Monitor for areas of redness and/or skin breakdown  2. Assess vascular access sites hourly  3. Every 4-6 hours minimum:  Change oxygen saturation probe site  4. Every 4-6 hours:  If on nasal continuous positive airway pressure, respiratory therapy assess nares and determine need for appliance change or resting period.   5/17/2022 2010 by Karolina Pollack RN  Outcome: Progressing  5/17/2022 0843 by Gianni Stringer RN  Outcome: Progressing

## 2022-05-18 NOTE — PLAN OF CARE
Problem: Pain  Goal: Verbalizes/displays adequate comfort level or baseline comfort level  5/18/2022 1146 by Katelyn Mcdonough RN  Outcome: Completed  5/18/2022 0907 by Katelyn Mcdonough RN  Outcome: Progressing     Problem: Infection - Adult  Goal: Absence of infection at discharge  5/18/2022 1146 by Katelyn Mcdonough RN  Outcome: Completed  5/18/2022 0907 by Katelyn Mcdonough RN  Outcome: Progressing  Goal: Absence of infection during hospitalization  5/18/2022 1146 by Katelyn Mcdonough RN  Outcome: Completed  5/18/2022 0907 by Katelyn Mcdonough RN  Outcome: Progressing  Goal: Absence of fever/infection during anticipated neutropenic period  5/18/2022 1146 by Katelyn Mcdonough RN  Outcome: Completed  5/18/2022 0907 by Katelyn Mcdonough RN  Outcome: Progressing     Problem: Safety - Adult  Goal: Free from fall injury  5/18/2022 1146 by Katelyn Mcdonough RN  Outcome: Completed  5/18/2022 0907 by Katelyn Mcdonough RN  Outcome: Progressing     Problem: Discharge Planning  Goal: Discharge to home or other facility with appropriate resources  5/18/2022 1146 by Katelyn Mcdonough RN  Outcome: Completed  5/18/2022 0907 by Katelyn Mcdonough RN  Outcome: Progressing     Problem: Skin/Tissue Integrity  Goal: Absence of new skin breakdown  Description: 1. Monitor for areas of redness and/or skin breakdown  2. Assess vascular access sites hourly  3. Every 4-6 hours minimum:  Change oxygen saturation probe site  4. Every 4-6 hours:  If on nasal continuous positive airway pressure, respiratory therapy assess nares and determine need for appliance change or resting period.   5/18/2022 1146 by Katelyn Mcdonough RN  Outcome: Completed  5/18/2022 0907 by Katelyn Mcdonough RN  Outcome: Progressing

## 2022-05-18 NOTE — PROGRESS NOTES
CLINICAL PHARMACY NOTE: MEDS TO BEDS    Total # of Prescriptions Filled: 1   The following medications were delivered to the patient:  · Ibuprofen 800mg tab    Additional Documentation:

## 2022-05-18 NOTE — PLAN OF CARE
Problem: Pain  Goal: Verbalizes/displays adequate comfort level or baseline comfort level  5/18/2022 0907 by Bruce Fernandez RN  Outcome: Progressing  5/17/2022 2010 by Corrine Lowery RN  Outcome: Progressing     Problem: Infection - Adult  Goal: Absence of infection at discharge  5/18/2022 0907 by Bruce Fernandez RN  Outcome: Progressing  5/17/2022 2010 by Corrine Lowery RN  Outcome: Progressing  Goal: Absence of infection during hospitalization  5/18/2022 0907 by Bruce Fernandez RN  Outcome: Progressing  5/17/2022 2010 by Corrine Lowery RN  Outcome: Progressing  Goal: Absence of fever/infection during anticipated neutropenic period  5/18/2022 0907 by Bruce Fernandez RN  Outcome: Progressing  5/17/2022 2010 by Corrine Lowery RN  Outcome: Progressing     Problem: Safety - Adult  Goal: Free from fall injury  5/18/2022 0907 by Bruce Fernandez RN  Outcome: Progressing  5/17/2022 2010 by Corrien Lowery RN  Outcome: Progressing     Problem: Discharge Planning  Goal: Discharge to home or other facility with appropriate resources  5/18/2022 0907 by Bruce Fernandez RN  Outcome: Progressing  5/17/2022 2010 by Corrine Lowery RN  Outcome: Progressing     Problem: Skin/Tissue Integrity  Goal: Absence of new skin breakdown  Description: 1. Monitor for areas of redness and/or skin breakdown  2. Assess vascular access sites hourly  3. Every 4-6 hours minimum:  Change oxygen saturation probe site  4. Every 4-6 hours:  If on nasal continuous positive airway pressure, respiratory therapy assess nares and determine need for appliance change or resting period.   5/18/2022 0907 by Bruce Fernandez RN  Outcome: Progressing  5/17/2022 2010 by Corrine Lowery RN  Outcome: Progressing

## 2022-05-18 NOTE — LACTATION NOTE
This note was copied from a baby's chart.  -mom reports prior BF experience x 1 year  -baby has + output and weight is WNL  -states breastfeeding is going well, denies pain with feeds  -peds suspicious for ankyloglossia, reviewed tx options  -parents prefer a watchful waiting approach at this time  -encouraged to schedule peds appt for Friday  -call warmline with questions/concerns or for outpatient LC appt  -follow up at breastfeeding support group as needed  -mom and dad both verbalize understanding of all teaching

## 2022-05-19 NOTE — DISCHARGE SUMMARY
Vaginal Delivery  Discharge Summary       Reasons for Admission on: 2022  7:37 AM  Induction of Labor    Prenatal Procedures  None    Intrapartum Procedures  Delivery Method: N/A    Postpartum Procedures  None     Data  Information for the patient's :  Yusuf Nath [[id number]]   female   Birth Weight: 8 lb 0.2 oz (3.635 kg)       Discharge With Mother  Complications: No    Discharge Diagnosis  Patient Active Problem List    Diagnosis Date Noted    45 weeks gestation of pregnancy     Normal labor     Admitted to labor and delivery        Discharge Information  Current Discharge Medication List        START taking these medications    Details   ibuprofen (ADVIL;MOTRIN) 600 MG tablet Take 1 tablet by mouth every 6 hours as needed for Pain  Qty: 30 tablet, Refills: 3           STOP taking these medications       Oropnr-PoKgo-RtUzb-Meth-FA-DHA (PRENATE MINI) 18-0.6-0.4-350 MG CAPS Comments:   Reason for Stopping:                 Discharge to: Home        Discharge Date:     Zeynep Knapp MD  May 19, 2022

## 2023-05-11 ENCOUNTER — OFFICE VISIT (OUTPATIENT)
Dept: PRIMARY CARE | Facility: CLINIC | Age: 33
End: 2023-05-11
Payer: COMMERCIAL

## 2023-05-11 VITALS
HEIGHT: 66 IN | OXYGEN SATURATION: 97 % | WEIGHT: 117.6 LBS | TEMPERATURE: 98.8 F | SYSTOLIC BLOOD PRESSURE: 106 MMHG | DIASTOLIC BLOOD PRESSURE: 69 MMHG | HEART RATE: 108 BPM | BODY MASS INDEX: 18.9 KG/M2

## 2023-05-11 DIAGNOSIS — F41.9 ANXIETY: Primary | ICD-10-CM

## 2023-05-11 DIAGNOSIS — R35.0 URINARY FREQUENCY: ICD-10-CM

## 2023-05-11 DIAGNOSIS — Z00.00 ROUTINE ADULT HEALTH MAINTENANCE: Primary | ICD-10-CM

## 2023-05-11 PROBLEM — D72.819 DECREASED WHITE BLOOD CELL COUNT: Status: ACTIVE | Noted: 2023-05-11

## 2023-05-11 PROBLEM — Z78.9 BREASTFEEDING (INFANT): Status: ACTIVE | Noted: 2023-05-11

## 2023-05-11 PROBLEM — Z67.40 BLOOD TYPE O+: Status: ACTIVE | Noted: 2023-05-11

## 2023-05-11 PROBLEM — J45.909 RAD (REACTIVE AIRWAY DISEASE) (HHS-HCC): Status: ACTIVE | Noted: 2023-05-11

## 2023-05-11 PROBLEM — G56.01 CARPAL TUNNEL SYNDROME OF RIGHT WRIST: Status: ACTIVE | Noted: 2023-05-11

## 2023-05-11 PROCEDURE — 99213 OFFICE O/P EST LOW 20 MIN: CPT | Performed by: NURSE PRACTITIONER

## 2023-05-11 PROCEDURE — 1036F TOBACCO NON-USER: CPT | Performed by: NURSE PRACTITIONER

## 2023-05-11 RX ORDER — ALBUTEROL SULFATE 90 UG/1
2 AEROSOL, METERED RESPIRATORY (INHALATION) EVERY 6 HOURS PRN
COMMUNITY
End: 2023-06-22 | Stop reason: SDUPTHER

## 2023-05-11 RX ORDER — SERTRALINE HYDROCHLORIDE 25 MG/1
25 TABLET, FILM COATED ORAL DAILY
COMMUNITY
End: 2023-05-11 | Stop reason: SDUPTHER

## 2023-05-11 RX ORDER — SERTRALINE HYDROCHLORIDE 25 MG/1
25 TABLET, FILM COATED ORAL DAILY
Qty: 90 TABLET | Refills: 1 | Status: SHIPPED | OUTPATIENT
Start: 2023-05-11 | End: 2023-11-14 | Stop reason: SDUPTHER

## 2023-05-11 ASSESSMENT — PATIENT HEALTH QUESTIONNAIRE - PHQ9
2. FEELING DOWN, DEPRESSED OR HOPELESS: NOT AT ALL
2. FEELING DOWN, DEPRESSED OR HOPELESS: NOT AT ALL
1. LITTLE INTEREST OR PLEASURE IN DOING THINGS: NOT AT ALL
8. MOVING OR SPEAKING SO SLOWLY THAT OTHER PEOPLE COULD HAVE NOTICED. OR THE OPPOSITE, BEING SO FIGETY OR RESTLESS THAT YOU HAVE BEEN MOVING AROUND A LOT MORE THAN USUAL: NOT AT ALL
SUM OF ALL RESPONSES TO PHQ9 QUESTIONS 1 AND 2: 0
7. TROUBLE CONCENTRATING ON THINGS, SUCH AS READING THE NEWSPAPER OR WATCHING TELEVISION: NOT AT ALL
SUM OF ALL RESPONSES TO PHQ9 QUESTIONS 1 AND 2: 0
3. TROUBLE FALLING OR STAYING ASLEEP OR SLEEPING TOO MUCH: NOT AT ALL
6. FEELING BAD ABOUT YOURSELF - OR THAT YOU ARE A FAILURE OR HAVE LET YOURSELF OR YOUR FAMILY DOWN: NOT AT ALL
4. FEELING TIRED OR HAVING LITTLE ENERGY: SEVERAL DAYS
5. POOR APPETITE OR OVEREATING: NOT AT ALL
1. LITTLE INTEREST OR PLEASURE IN DOING THINGS: NOT AT ALL
9. THOUGHTS THAT YOU WOULD BE BETTER OFF DEAD, OR OF HURTING YOURSELF: NOT AT ALL
10. IF YOU CHECKED OFF ANY PROBLEMS, HOW DIFFICULT HAVE THESE PROBLEMS MADE IT FOR YOU TO DO YOUR WORK, TAKE CARE OF THINGS AT HOME, OR GET ALONG WITH OTHER PEOPLE: NOT DIFFICULT AT ALL
SUM OF ALL RESPONSES TO PHQ QUESTIONS 1-9: 1

## 2023-05-11 ASSESSMENT — ENCOUNTER SYMPTOMS
HEMATURIA: 0
CHILLS: 1
ABDOMINAL PAIN: 0
BACK PAIN: 1
VOMITING: 0
DYSURIA: 0
WHEEZING: 0
FREQUENCY: 1
FEVER: 0
SHORTNESS OF BREATH: 0

## 2023-05-11 ASSESSMENT — ANXIETY QUESTIONNAIRES
1. FEELING NERVOUS, ANXIOUS, OR ON EDGE: SEVERAL DAYS
4. TROUBLE RELAXING: NOT AT ALL
7. FEELING AFRAID AS IF SOMETHING AWFUL MIGHT HAPPEN: NOT AT ALL
6. BECOMING EASILY ANNOYED OR IRRITABLE: NOT AT ALL
2. NOT BEING ABLE TO STOP OR CONTROL WORRYING: NOT AT ALL
GAD7 TOTAL SCORE: 1
5. BEING SO RESTLESS THAT IT IS HARD TO SIT STILL: NOT AT ALL
IF YOU CHECKED OFF ANY PROBLEMS ON THIS QUESTIONNAIRE, HOW DIFFICULT HAVE THESE PROBLEMS MADE IT FOR YOU TO DO YOUR WORK, TAKE CARE OF THINGS AT HOME, OR GET ALONG WITH OTHER PEOPLE: NOT DIFFICULT AT ALL
3. WORRYING TOO MUCH ABOUT DIFFERENT THINGS: NOT AT ALL

## 2023-05-11 NOTE — PATIENT INSTRUCTIONS
antibiotic  Start otc azo  Fluids    Continue sertraline  Refill done    Return in nov for wellness appt      I will communicate with you via [employer] regarding messages and results. If you need help with this, you can call the support line at [phone].    IT WAS A PLEASURE TO SEE YOU TODAY. THANK YOU FOR CHOOSING US FOR YOUR HEALTHCARE NEEDS.

## 2023-05-11 NOTE — PROGRESS NOTES
Subjective   Patient ID: Lorraine Mendenhall is a 32 y.o. female who presents for Follow-up (6 months follow-up and she states she has something else to discuss with you./Last Alubterol-April/Fasting-No/Breastfeeding-Yes/).  Last physical: 11/11/22    Name of ob gyn-[facility]  Last albuterol use april  Is pt still breastfeeding  yes  Is pt fasting? no  Last labs-5/2021 tsh nl, cmp nl, cbc nl  Due for all labs from [facility] except cmp      HPI  Saw UC yesterday frequency, urgency, small amt, rt flank pain, foamy urine, chills, sweats  No burning with urination, abd pain, or vomiting  Blood in urine per ua but told no uti  Has uti per culture today; sent in abx today    On sertraline 25mg 1 a day  Doing well    phq9=1  gad7=1    no delusions, hallucinations, uncontrollable/purposeless mvmts, or fixed/inflexible posture  no extreme mood swings that include emotional highs and lows,      Abnormally upbeat, jumpy or wired,      Increased activity, energy or agitation,      Exaggerated sense of well-being and self-confidence (euphoria),      Irritable,      Decreased need for sleep,      Unusual talkativeness,      Racing thoughts,      Distractibility,      Poor decision-making - for example, going on buying sprees, taking sexual risks or making foolish investments           affects sleep, energy, activity, judgment, behavior and the ability to think clearly.        No care team member to display     Review of Systems   Constitutional:  Positive for chills. Negative for fever.   Respiratory:  Negative for shortness of breath and wheezing.    Cardiovascular:  Negative for chest pain.   Gastrointestinal:  Negative for abdominal pain and vomiting.   Genitourinary:  Positive for frequency and urgency. Negative for dysuria and hematuria.   Musculoskeletal:  Positive for back pain.       Objective   Visit Vitals  /69   Pulse 108   Temp 37.1 °C (98.8 °F) (Oral)      BP Readings from Last 3 Encounters:   05/11/23 106/69   11/11/22  116/78   08/09/21 108/64     Wt Readings from Last 3 Encounters:   05/11/23 53.3 kg (117 lb 9.6 oz)   11/11/22 56.7 kg (125 lb)   08/09/21 52.7 kg (116 lb 4 oz)       Physical Exam  Constitutional:       General: She is not in acute distress.     Appearance: Normal appearance.   Neurological:      Mental Status: She is alert.       Assessment/Plan   Diagnoses and all orders for this visit:  Anxiety  -     sertraline (Zoloft) 25 mg tablet; Take 1 tablet (25 mg) by mouth once daily.  Urinary frequency

## 2023-05-12 ENCOUNTER — TELEPHONE (OUTPATIENT)
Dept: PRIMARY CARE | Facility: CLINIC | Age: 33
End: 2023-05-12
Payer: COMMERCIAL

## 2023-05-12 DIAGNOSIS — R35.0 URINARY FREQUENCY: Primary | ICD-10-CM

## 2023-05-12 NOTE — TELEPHONE ENCOUNTER
Pt just started antibiotic yesterday. It can take up to 72 hrs for the antibiotic to be therapeutic.  Take tylenol 500mg 2 every 6hrs alternating with ibuprofen 200mg 3 every 6hrs.  So take tylenol then 3hrs later ibuprofen then 3hrs later tylenol.  Drink plenty of fluids  Any diarrhea or vomiting?  Go to ER if start vomiting or fever 104 or higher or abdominal pain or back pain worsening.

## 2023-05-15 LAB
NON-UH HIE BASO COUNT: 0.03 X1000 (ref 0–0.2)
NON-UH HIE BASOS %: 0.7 %
NON-UH HIE CALCULATED LDL CHOLESTEROL: 98 MG/DL (ref 60–130)
NON-UH HIE CHOLESTEROL: 162 MG/DL (ref 100–200)
NON-UH HIE DIFF?: NO
NON-UH HIE EOS COUNT: 0.1 X1000 (ref 0–0.5)
NON-UH HIE EOSIN %: 2.8 %
NON-UH HIE HCT: 36.2 % (ref 36–46)
NON-UH HIE HDL CHOLESTEROL: 52 MG/DL (ref 40–60)
NON-UH HIE HGB: 12.2 G/DL (ref 12–16)
NON-UH HIE INSTR WBC: 3.8
NON-UH HIE LYMPH %: 31.4 %
NON-UH HIE LYMPH COUNT: 1.18 X1000 (ref 1.2–4.8)
NON-UH HIE MCH: 31.6 PG (ref 27–34)
NON-UH HIE MCHC: 33.6 G/DL (ref 32–37)
NON-UH HIE MCV: 94.1 FL (ref 80–100)
NON-UH HIE MONO %: 10 %
NON-UH HIE MONO COUNT: 0.38 X1000 (ref 0.1–1)
NON-UH HIE MPV: 9.3 FL (ref 7.4–10.4)
NON-UH HIE NEUTROPHIL %: 55.2 %
NON-UH HIE NEUTROPHIL COUNT (ANC): 2.09 X1000 (ref 1.4–8.8)
NON-UH HIE NUCLEATED RBC: 0 /100WBC
NON-UH HIE PLATELET: 278 X10 (ref 150–450)
NON-UH HIE RBC: 3.85 X10 (ref 4.2–5.4)
NON-UH HIE RDW: 12.6 % (ref 11.5–14.5)
NON-UH HIE TOTAL CHOL/HDL CHOL RATIO: 3.1
NON-UH HIE TRIGLYCERIDES: 61 MG/DL (ref 30–150)
NON-UH HIE TSH: 3.06 UIU/ML (ref 0.55–4.78)
NON-UH HIE WBC: 3.8 X10 (ref 4.5–11)

## 2023-05-17 NOTE — TELEPHONE ENCOUNTER
If she has no symptoms then she can retest in 2wks. She can just stop in and give a sample in 2wks.    If she is still having symptoms then let me know and I will send in a different antibiotic.  I would just need to know which antibiotic she was on to be able to send in a new one.

## 2023-05-18 ENCOUNTER — TELEPHONE (OUTPATIENT)
Dept: PRIMARY CARE | Facility: CLINIC | Age: 33
End: 2023-05-18
Payer: COMMERCIAL

## 2023-05-18 DIAGNOSIS — R79.89 ABNORMAL CBC: Primary | ICD-10-CM

## 2023-05-18 NOTE — TELEPHONE ENCOUNTER
If her work requires the shot then I guess she has to get the shot.  If work will allow her to do a titer then I can order that.  Pls find out more info.

## 2023-05-18 NOTE — TELEPHONE ENCOUNTER
Pt called and said she needs a varicella shot for her work, she said she had one in 2020 when she gave birth at [facility].  Does she need another shot or a titer?

## 2023-05-22 ENCOUNTER — CLINICAL SUPPORT (OUTPATIENT)
Dept: PRIMARY CARE | Facility: CLINIC | Age: 33
End: 2023-05-22
Payer: COMMERCIAL

## 2023-05-22 PROCEDURE — 90471 IMMUNIZATION ADMIN: CPT | Performed by: NURSE PRACTITIONER

## 2023-05-22 PROCEDURE — 90716 VAR VACCINE LIVE SUBQ: CPT | Performed by: NURSE PRACTITIONER

## 2023-06-09 ENCOUNTER — CLINICAL SUPPORT (OUTPATIENT)
Dept: PRIMARY CARE | Facility: CLINIC | Age: 33
End: 2023-06-09
Payer: COMMERCIAL

## 2023-06-09 DIAGNOSIS — R39.9 UTI SYMPTOMS: Primary | ICD-10-CM

## 2023-06-09 PROCEDURE — 87086 URINE CULTURE/COLONY COUNT: CPT

## 2023-06-10 LAB — URINE CULTURE: NORMAL

## 2023-06-22 ENCOUNTER — OFFICE VISIT (OUTPATIENT)
Dept: PRIMARY CARE | Facility: CLINIC | Age: 33
End: 2023-06-22
Payer: COMMERCIAL

## 2023-06-22 VITALS
HEIGHT: 66 IN | HEART RATE: 83 BPM | BODY MASS INDEX: 18.48 KG/M2 | WEIGHT: 115 LBS | SYSTOLIC BLOOD PRESSURE: 111 MMHG | DIASTOLIC BLOOD PRESSURE: 75 MMHG | TEMPERATURE: 97.7 F

## 2023-06-22 DIAGNOSIS — R35.0 URINARY FREQUENCY: Primary | ICD-10-CM

## 2023-06-22 DIAGNOSIS — J45.20 MILD INTERMITTENT REACTIVE AIRWAY DISEASE WITHOUT COMPLICATION (HHS-HCC): ICD-10-CM

## 2023-06-22 PROCEDURE — 1036F TOBACCO NON-USER: CPT | Performed by: NURSE PRACTITIONER

## 2023-06-22 PROCEDURE — 99213 OFFICE O/P EST LOW 20 MIN: CPT | Performed by: NURSE PRACTITIONER

## 2023-06-22 RX ORDER — CIPROFLOXACIN 500 MG/1
500 TABLET ORAL 2 TIMES DAILY
Qty: 14 TABLET | Refills: 0 | Status: SHIPPED | OUTPATIENT
Start: 2023-06-22 | End: 2023-06-29

## 2023-06-22 RX ORDER — ALBUTEROL SULFATE 90 UG/1
2 AEROSOL, METERED RESPIRATORY (INHALATION) EVERY 6 HOURS PRN
Qty: 18 G | Refills: 1 | Status: SHIPPED | OUTPATIENT
Start: 2023-06-22

## 2023-06-22 ASSESSMENT — ENCOUNTER SYMPTOMS
SHORTNESS OF BREATH: 0
CONSTITUTIONAL NEGATIVE: 1
WHEEZING: 0

## 2023-06-22 NOTE — PROGRESS NOTES
Subjective   Patient ID: Lorraine Mendenhall is a 32 y.o. female who presents for UTI (Current sx frequent urination not being able to fully empty bladder.back pain/Sx started on Monday  ).  Last physical:  11/11/22      HPI  Had urinary frequency 1 1/2 mon ago  uti symptoms for  x3d  Urinary frequency, not able to fully empty, urgency and back pain noted  no dysuria,  hematuria, lower abdominal pain, fever, chills, cloudy urine, odor to urine, small amount of urine when urinate, nausea, vomiting, incontinence  last uti-4/2023, 5/2023   Neg cx as recheck 6/2023  h/o kidney stones-none  no itching, skin feels like it is burning, redness, rash, thick (thin or watery) vaginal d/c, irritation of genital area, swelling of genital area, painful IC  no fishy vaginal odor especially after IC or thin whitish-gray vaginal d/c    selftxt: azo yesterday, motrin        No care team member to display     Review of Systems   Constitutional: Negative.    Respiratory:  Negative for shortness of breath and wheezing.    Cardiovascular:  Negative for chest pain.       Objective   Visit Vitals  /75   Pulse 83   Temp 36.5 °C (97.7 °F)      BP Readings from Last 3 Encounters:   06/22/23 111/75   05/11/23 106/69   11/11/22 116/78     Wt Readings from Last 3 Encounters:   06/22/23 52.2 kg (115 lb)   05/11/23 53.3 kg (117 lb 9.6 oz)   11/11/22 56.7 kg (125 lb)       Physical Exam  Constitutional:       General: She is not in acute distress.     Appearance: Normal appearance.   Neurological:      Mental Status: She is alert.         Assessment/Plan   Diagnoses and all orders for this visit:  Urinary frequency  -     Urine Culture; Future  -     POCT UA Automated manually resulted; Future  -     ciprofloxacin (Cipro) 500 mg tablet; Take 1 tablet (500 mg) by mouth 2 times a day for 7 days.  -     Referral to Urology; Future  Mild intermittent reactive airway disease without complication  -     albuterol 90 mcg/actuation inhaler; Inhale 2  puffs every 6 hours if needed for wheezing.  Other orders  -     Follow Up In Primary Care; Future

## 2023-06-22 NOTE — PATIENT INSTRUCTIONS
urine culture result back in 2-3d  push fluids; can try cranberry juice  avoid drinks that irritate the bladder like coffee, alcohol, and soft drinks containing citrus juices or caffeine until your infection has cleared.   rest  tylenol  ibuprofen  heating pad on abdomen  start antibiotic; you can stop the antibiotic if the culture comes back negative/normal.  call if sx worsen or change especially fever, chills, or back pain or not starting to get better in 2-3d    Return in nov for wellness appt    I will communicate with you via [employer] regarding messages and results. If you need help with this, you can call the support line at [phone].    IT WAS A PLEASURE TO SEE YOU TODAY. THANK YOU FOR CHOOSING US FOR YOUR HEALTHCARE NEEDS.

## 2023-06-28 ENCOUNTER — PATIENT OUTREACH (OUTPATIENT)
Dept: CARE COORDINATION | Facility: CLINIC | Age: 33
End: 2023-06-28
Payer: COMMERCIAL

## 2023-06-28 NOTE — PROGRESS NOTES
Discharge Facility:  [facility]   Discharge Diagnosis:  Genitourinary Pyelonephritis  ESCHERICHIA COLI  Admission Date:  6/25/23  Discharge Date:  6/27/23     PCP Appointment Date:  7/6/23  Specialist Appointment Date:  pt to call today for f/ with ob   Hospital Encounter and Summary: Linked   See discharge assessment below for further details    Lab monitoring plan/orders Lab/Frequency While patient is on   CBC/Diff every Monday Ertapenem   Creatinine every Monday Ertapenem   LFTs every Monday Ertapenem     Order Current Status   ENTERIC BACTERIAL PANEL BY PCR In process   HIV 1 2 COMBO(AG/AB),WITH REFLEX TO DIFFERENTIATION In process   IMMUNOGLOBULINS EUGENE In process   BLOOD CULTURE Preliminary result   BLOOD CULTURE Preliminary result     Engagement  Call Start Time: 0940 (6/28/2023  9:48 AM)    Medications  Medications reviewed with patient/caregiver?: Yes (6/28/2023  9:48 AM)  Is the patient having any side effects they believe may be caused by any medication additions or changes?: No (6/28/2023  9:48 AM)  Does the patient have all medications ordered at discharge?: Yes (6/28/2023  9:48 AM)  Prescription Comments: SCRIPT FOR INVANC until 7/8 (6/28/2023  9:48 AM)  Is the patient taking all medications as directed (includes completed medication regime)?: Yes (6/28/2023  9:48 AM)  Care Management Interventions: Provided patient education (6/28/2023  9:48 AM)  Medication Comments: PT AWARE TO STOP CIPRO (6/28/2023  9:48 AM)    Appointments  Does the patient have a primary care provider?: Yes (6/28/2023  9:48 AM)  Care Management Interventions: Verified appointment date/time/provider (6/28/2023  9:48 AM)  Has the patient kept scheduled appointments due by today?: Yes (6/28/2023  9:48 AM)  Care Management Interventions: Advised patient to keep appointment (6/28/2023  9:48 AM)    Self Management  Has home health visited the patient within 72 hours of discharge?: Yes (6/28/2023  9:48 AM)  What Durable Medical  Equipment (DME) was ordered?: PICC LINE (6/28/2023  9:48 AM)  Has all Durable Medical Equipment (DME) been delivered?: Yes (6/28/2023  9:48 AM)    Patient Teaching  Does the patient have access to their discharge instructions?: Yes (6/28/2023  9:48 AM)  Care Management Interventions: Reviewed instructions with patient (6/28/2023  9:48 AM)  What is the patient's perception of their health status since discharge?: Improving (6/28/2023  9:48 AM)  Is the patient/caregiver able to teach back the hierarchy of who to call/visit for symptoms/problems? PCP, Specialist, Home Health nurse, Urgent Care, ED, 911: Yes (6/28/2023  9:48 AM)    Wrap Up  Wrap Up Additional Comments: CM SPOKE WITH PT WHO STATE SHE is doing well at home. hhc coming today at start IV abx. pt aware to stop cipro. pcp appt made. contact info provided. s/s of worsening condition discussed and when to call the doctor. (6/28/2023  9:48 AM)

## 2023-07-05 ASSESSMENT — ENCOUNTER SYMPTOMS
WHEEZING: 0
SHORTNESS OF BREATH: 0
CONSTITUTIONAL NEGATIVE: 1

## 2023-07-05 NOTE — PROGRESS NOTES
Subjective   Patient ID: Lorraine Mendenhall is a 32 y.o. female who presents for Hospital Follow-up (Pt is here for hospital F/U UTI  /Current sx- not having any sx, low energy).  Last physical: 11/11/22; has appt scheduled      HPI  In hospital 6/25/23-6/27/23; dx ESBL uti; got PICC line to get outpt [facility] daily thru 7/8/23  Seeing urology dr charles 8/15/23    No current sx other than low energy    ENTERIC BACTERIAL PANEL BY PCR -no stool bacteria/virus  HIV 1 2 COMBO(AG/AB),WITH REFLEX TO DIFFERENTIATION neg  IMMUNOGLOBULINS EUGENE -WNL  BLOOD CULTURE no growth x 2      DISCHARGE SUMMARY     PATIENT NAME: Lorraine Mendenhall ADMISSION DATE: 6/25/2023   MRN: [id number] DISCHARGE DATE: 6/27/2023       ATTENDING PHYSICIAN: Mina Miguel MD Code Status: Not on file  PCP: DARIO Ferrera.CNP    Highest Readmission Risk Score: 9   The 30 day readmissions risk score is derived from an internally validated risk model which evaluates patient level characteristics, utilization history, medication orders and lab results up until the day of discharge. Patients with a score of 40 or above are considered highest risk for readmission. Specific patient level drivers will be listed at the bottom of the summary.    TRANSITIONS OF CARE CRITICAL ISSUES:    KEY MEDICATION CHANGES:   START IV ertapenem every day until 7/8 and probiotic daily  STOP ciprofloxacin    LAB MONITORING NEEDED: CBC diff, CRT, LFT every Monday until 7/8 per COPAT    IMAGING FOLLOW-UP: Not applicable    LABS AND PROCEDURES PENDING AT DISCHARGE:   Test Results Not Yet Available from This Hospitalization: Please Review at Your Follow Up Appointment     Order Current Status   ENTERIC BACTERIAL PANEL BY PCR In process   HIV 1 2 COMBO(AG/AB),WITH REFLEX TO DIFFERENTIATION In process   IMMUNOGLOBULINS EUGENE In process   BLOOD CULTURE Preliminary result   BLOOD CULTURE Preliminary result         FOLLOW UP: PCP, Uro-gyn     REASON FOR HOSPITALIZATION: Flank pain, urinary  symptoms    PRINCIPAL DIAGNOSIS: ESBL Cystitis/pyelonephritis     SECONDARY DIAGNOSIS:   Principal Problem:  ESBL (extended spectrum beta-lactamase) producing bacteria infection POA: Yes  Active Problems:  RAD (reactive airway disease) POA: Yes  Complicated UTI (urinary tract infection) POA: Yes  Pyelonephritis POA: Yes  Cystitis POA: Yes  History of ESBL E. coli infection POA: Yes  History of pyelonephritis POA: Yes  History of acute cystitis POA: Yes  Sepsis without acute organ dysfunction (HCC) POA: Yes  Resolved Problems:  Diarrhea POA: Yes    HOSPITAL COURSE:   Lorraine Mendenhall is a 32 year old female presented with past medical history of reactive airway disease, generalized anxiety disorder, and recurrent UTIs with a history of ESBL infection. She recently stopped breastfeeding. Most recent UTI symptoms started this past Monday. She was evaluated by her PCP on Thursday and started Cipro. Presented due to fever, persistent symptoms. Sepsis criteria met with fever, tachycardia and infectious UA. ID recommended transitioning meropenem to ertapenem to treat left pyelonephritis with UC growing ESBL. PICC line placed for home-going IV ertapenem until 7/8. Urology consulted for further given recurrent infections, recommended outpatient uro-gyn follow up with appointment requested.. Diarrhea resolved during admission, stool studies negative.     OPERATIONS/PROCEDURE DURING THIS HOSPITALIZATION:   * No surgery found *  No other procedures     CONSULTS DURING HOSPITALIZATION:   Treatment Team:   Attending Provider: Mina Miguel MD  Consulting: Perico Angelo MD  Primary Service: Darby Yates PA-C  Orders Placed This Encounter  Physician Consult  CONSULT TO CC INFECTIOUS DISEASE (AV,IR,MM,VIRTUAL AT [facility])      PATIENT CONDITION AT DISCHARGE: Improved    ADVANCE CARE PLANNING DISCUSSION (if applicable): N/A    DISCHARGE DISPOSITION:   Home with Home Health    Discharge Physical Exam:  VITAL SIGNS: /71  Pulse 78  " Temp 36.9 °C (98.4 °F) (Oral)  Resp 16  Ht 165.1 cm (5' 5\")  Wt 52.3 kg (115 lb 4.8 oz)  LMP (LMP Unknown)  SpO2 96%  BMI 19.19 kg/m²   GENERAL: Alert, appears WDWN in no acute distress, cooperative.  HEAD: Normocephalic, atraumatic.  EYES: PERRLA, EOMI  NECK: No jugulovenous distention, supple  BACK: Back symmetric, normal curvature, ROM normal, no CVAT.  LUNGS: Lungs clear to auscultation, no adventitious lung sounds. Good diaphragmatic excursion  CARDIAC: Normal S1 and S2; no rubs, murmurs, or gallops  ABDOMEN: No CVAT on exam, pt reports improved. Abdomen soft, non-distended, non-tender. BS normal x 4. No masses or organomegaly  EXTREMITIES: Extremities normal, no deformities, edema, clubbing or skin discoloration. Good capillary refill. No ulcers.  NEURO: Alert and oriented x3. Grossly normal cognition, motor function, and cranial nerves II-XII intact. Gait normal. Sensation grossly intact. Normal strength UE + LE.  PULSES: 2+ radial, 2+ dorsalis pedis    WOUND/SURGICAL SITE CARE: None     SUPPLIES OR EQUIPMENT:  None    DIET:  Resume pre-hospital diet    ACTIVITY AND EXERCISE:  Resume pre-hospital activity    ADDITIONAL INFORMATION:   Hospital Course You were admitted with urinary symptoms and were found to have sepsis related to acute cystitis/pyelonephritis. Urine culture obtained revealed a resistant bacteria called ESBL which was treated with intravenous antibiotics at the guidance of the infectious disease team. Due to the resistant nature of this bacteria, you will be discharged on IV antibiotics via a PICC line. Home health care has been coordinated and will meet you the day after discharge to continue your once daily antibiotic. You will continue ertapenem once daily until 7/8. The PICC line will need to be removed following completion of antibiotics.    The inpatient urology team evaluated you given recurrence of UTI and recommended an appointment with outpatient uro-gynecology, this has been " requested for you. A follow up has also been requested with your primary care physician to review the events of this admission. Please return to be evaluated if you start experiencing fever (>100.4), vomiting or pain/redness around the PICC line.     FOLLOW UP APPOINTMENTS: No future appointments.    ALLERGIES   Allergen Reactions   Penicillin G Rash   **NOTE** Tolerated CEPHALEXIN (KEFLEX) 4/2023  **NOTE** Tolerated Meropenem 6/2023    Per patient, got penicillin about 12 years ago, over back, neck, shoulders; itchy; no throat closure, no swelling of tongue     DISCHARGE MEDICATION:  Current Discharge Medication List    START taking these medications    ertapenem (INVanz) 1 g  Inject 1 g intravenously every 24 hours.    Qty: 1000 mL Refills: 0     Stopped cipro    Continued with rest of meds    No care team member to display     Review of Systems   Constitutional: Negative.    Respiratory:  Negative for shortness of breath and wheezing.    Cardiovascular:  Negative for chest pain.       Objective   Visit Vitals  /74   Pulse 92   Temp 36.4 °C (97.5 °F)      BP Readings from Last 3 Encounters:   07/06/23 109/74   06/22/23 111/75   05/11/23 106/69     Wt Readings from Last 3 Encounters:   07/06/23 49.9 kg (110 lb)   06/22/23 52.2 kg (115 lb)   05/11/23 53.3 kg (117 lb 9.6 oz)       Physical Exam  Constitutional:       General: She is not in acute distress.     Appearance: Normal appearance.   Neurological:      Mental Status: She is alert.       Assessment/Plan   Diagnoses and all orders for this visit:  ESBL (extended spectrum beta-lactamase) producing bacteria infection  -     Urine Culture; Future  Acute pyelonephritis  -     Urine Culture; Future  Yeast infection  -     fluconazole (Diflucan) 150 mg tablet; 1 po now and 1 po in 7d

## 2023-07-06 ENCOUNTER — OFFICE VISIT (OUTPATIENT)
Dept: PRIMARY CARE | Facility: CLINIC | Age: 33
End: 2023-07-06
Payer: COMMERCIAL

## 2023-07-06 VITALS
BODY MASS INDEX: 17.68 KG/M2 | OXYGEN SATURATION: 98 % | HEIGHT: 66 IN | SYSTOLIC BLOOD PRESSURE: 109 MMHG | WEIGHT: 110 LBS | TEMPERATURE: 97.5 F | DIASTOLIC BLOOD PRESSURE: 74 MMHG | HEART RATE: 92 BPM

## 2023-07-06 DIAGNOSIS — B37.9 YEAST INFECTION: ICD-10-CM

## 2023-07-06 DIAGNOSIS — N10 ACUTE PYELONEPHRITIS: ICD-10-CM

## 2023-07-06 DIAGNOSIS — Z16.12 ESBL (EXTENDED SPECTRUM BETA-LACTAMASE) PRODUCING BACTERIA INFECTION: Primary | ICD-10-CM

## 2023-07-06 DIAGNOSIS — A49.9 ESBL (EXTENDED SPECTRUM BETA-LACTAMASE) PRODUCING BACTERIA INFECTION: Primary | ICD-10-CM

## 2023-07-06 PROCEDURE — 99495 TRANSJ CARE MGMT MOD F2F 14D: CPT | Performed by: NURSE PRACTITIONER

## 2023-07-06 PROCEDURE — 1036F TOBACCO NON-USER: CPT | Performed by: NURSE PRACTITIONER

## 2023-07-06 RX ORDER — ERTAPENEM 1 G/1
1 INJECTION, POWDER, LYOPHILIZED, FOR SOLUTION INTRAMUSCULAR; INTRAVENOUS
Qty: 10 G | Refills: 0 | COMMUNITY
Start: 2023-06-28 | End: 2023-07-08

## 2023-07-06 RX ORDER — FLUCONAZOLE 150 MG/1
TABLET ORAL
Qty: 2 TABLET | Refills: 0 | Status: SHIPPED | OUTPATIENT
Start: 2023-07-06 | End: 2023-11-14 | Stop reason: ALTCHOICE

## 2023-07-06 ASSESSMENT — PATIENT HEALTH QUESTIONNAIRE - PHQ9
2. FEELING DOWN, DEPRESSED OR HOPELESS: NOT AT ALL
1. LITTLE INTEREST OR PLEASURE IN DOING THINGS: NOT AT ALL
SUM OF ALL RESPONSES TO PHQ9 QUESTIONS 1 AND 2: 0

## 2023-07-06 NOTE — PATIENT INSTRUCTIONS
Finish picc line antibiotic    Diflucan for yeast symptoms    Repeat urine culture 1wk after antibiotics done    Keep dr charles appt in aug      I will communicate with you via [employer] regarding messages and results. If you need help with this, you can call the support line at [phone].    IT WAS A PLEASURE TO SEE YOU TODAY. THANK YOU FOR CHOOSING US FOR YOUR HEALTHCARE NEEDS.

## 2023-07-19 ENCOUNTER — PATIENT OUTREACH (OUTPATIENT)
Dept: CARE COORDINATION | Facility: CLINIC | Age: 33
End: 2023-07-19
Payer: COMMERCIAL

## 2023-08-22 ENCOUNTER — PATIENT OUTREACH (OUTPATIENT)
Dept: CARE COORDINATION | Facility: CLINIC | Age: 33
End: 2023-08-22
Payer: COMMERCIAL

## 2023-08-22 NOTE — PROGRESS NOTES
Call regarding appt. with PCP on 7/6 after hospitalization.  At time of outreach call the patient feels as if their condition has (improved since last visit.  Reviewed the PCP appointment with the pt and addressed any questions or concerns.

## 2023-09-20 ENCOUNTER — PATIENT OUTREACH (OUTPATIENT)
Dept: CARE COORDINATION | Facility: CLINIC | Age: 33
End: 2023-09-20
Payer: COMMERCIAL

## 2023-11-13 PROBLEM — D72.819 DECREASED WHITE BLOOD CELL COUNT: Status: RESOLVED | Noted: 2023-05-11 | Resolved: 2023-11-13

## 2023-11-13 ASSESSMENT — ENCOUNTER SYMPTOMS
FATIGUE: 0
HALLUCINATIONS: 0
COUGH: 0
VOMITING: 0
BRUISES/BLEEDS EASILY: 0
UNEXPECTED WEIGHT CHANGE: 0
EYE DISCHARGE: 0
CONFUSION: 0
PALPITATIONS: 0
POLYDIPSIA: 0
CHILLS: 0
WOUND: 0
ADENOPATHY: 0
TROUBLE SWALLOWING: 0
BLOOD IN STOOL: 0
NECK PAIN: 0
EYE PAIN: 0
DIZZINESS: 0
ABDOMINAL PAIN: 0
SHORTNESS OF BREATH: 0
DYSURIA: 0
APPETITE CHANGE: 0
BACK PAIN: 0
EYE REDNESS: 0
HEMATURIA: 0
FREQUENCY: 0
FEVER: 0
SORE THROAT: 0
POLYPHAGIA: 0
HEADACHES: 0

## 2023-11-13 NOTE — PROGRESS NOTES
Subjective   Patient ID: Lorraine Mendenhall is a 32 y.o. female who presents for Annual Exam.      Is pt fasting? No, had a couple sips of coffee with creamer.  Does pt see any providers other than care team below:   Dr. Clements- GYN   Does pt want flu shot? yes  Last albuterol inh use- a month ago  Phq9= 0  , gad7=0    No care team member to display    HPI  Last labs-7/2023 liver nl, cbc nl; 6/2023 bmp k low; 5/2023 lipid nl, tsh nl  Due for labs- cmp    Cholesterol   Date Value Ref Range Status   01/23/2018 174 0 - 199 mg/dL Final     Comment:     .      AGE      DESIRABLE   BORDERLINE HIGH   HIGH     0-19 Y     0 - 169       170 - 199     >/= 200    20-24 Y     0 - 189       190 - 224     >/= 225         >24 Y     0 - 199       200 - 239     >/= 240   **All ranges are based on fasting samples. Specific   therapeutic targets will vary based on patient-specific   cardiac risk.  .   Pediatric guidelines reference:Pediatrics 2011, 128(S5).   Adult guidelines reference: NCEP ATPIII Guidelines,     PHIL 2001, 258:2486-97  .   Venipuncture immediately after or during the    administration of Metamizole may lead to falsely   low results. Testing should be performed immediately   prior to Metamizole dosing.       Triglycerides   Date Value Ref Range Status   01/23/2018 129 0 - 149 mg/dL Final     Comment:     .      AGE      DESIRABLE   BORDERLINE HIGH   HIGH     VERY HIGH   0 D-90 D    19 - 174         ----         ----        ----  91 D- 9 Y     0 -  74        75 -  99     >/= 100      ----    10-19 Y     0 -  89        90 - 129     >/= 130      ----    20-24 Y     0 - 114       115 - 149     >/= 150      ----         >24 Y     0 - 149       150 - 199    200- 499    >/= 500  .   Venipuncture immediately after or during the    administration of Metamizole may lead to falsely   low results. Testing should be performed immediately   prior to Metamizole dosing.       HDL   Date Value Ref Range Status   01/23/2018 66.1 mg/dL  "Final     Comment:     .      AGE      VERY LOW   LOW     NORMAL    HIGH       0-19 Y       < 35   < 40     40-45     ----    20-24 Y       ----   < 40       >45     ----      >24 Y       ----   < 40     40-60      >60  .       No results found for: \"LDL\"  TSH   Date Value Ref Range Status   05/03/2021 2.99 0.44 - 3.98 mIU/L Final     Comment:      TSH testing is performed using different testing    methodology at [facility] than at other    [facility] hospitals. Direct result comparisons should    only be made within the same method.       No results found for: \"A1C\"  No components found for: \"POCA1C\"  No results found for: \"ALBUR\"  No components found for: \"POCALBUR\"      Other concerns:none    bps at home- none    ER/urgicare visits in the last year- summer esbl  Hospitalizations in the last year- none      last Pap- 8/30/22; due 9/2025  H/o abn pap-8yrs ago    FH ovarian, endometrial, cervical, uterine ca-none    Current birth control method-none  No change in contraception desired      FH br ca-none      FH colon ca-dad-age 67      Exercise-a few days a wk and chasing kids  Diet- 3 meals a day   Body mass index is 19.14 kg/m².      last dental appt- may 2023    BMs-regular  Sleep-able to fall asleep and stay asleep; no snoring or apnea  no cp, swelling, sob, abd pain, n/v/d/c, blood in stool or black stools  STI testing including hiv (age 15-65) and hep c screening (18-79)-declines        Immunization History   Administered Date(s) Administered    Pfizer Purple Cap SARS-CoV-2 01/22/2021, 02/12/2021    Tdap vaccine, age 7 year and older (BOOSTRIX) 06/09/2020    Varicella vaccine, subcutaneous (VARIVAX) 05/22/2023         fractures in lifetime-wrist      FH heart attack, heart surgery-bypass-mat gf  FH stroke-none    The ASCVD Risk score (Zacarias RAMOS, et al., 2019) failed to calculate for the following reasons:    The 2019 ASCVD risk score is only valid for ages 40 to 79  Coronary Artery Calcium " score:  This test is recommended for men 45 or older and women 55 or older without a history of heart disease and have 1 risk factor (high LDL cholesterol, low HDL cholesterol, high blood pressure, smoker (current or past), type 2 diabetes, IBD, lupus, RA, ankylosing spondylitis, psoriasis or family history of  heart disease <55yrs in dad, brother or child or <65yrs in mom, sister, or child.)       Review of Systems   Constitutional:  Negative for appetite change, chills, fatigue, fever and unexpected weight change.   HENT:  Negative for congestion, ear pain, sore throat and trouble swallowing.    Eyes:  Negative for pain, discharge and redness.   Respiratory:  Negative for cough and shortness of breath.    Cardiovascular:  Negative for chest pain and palpitations.   Gastrointestinal:  Negative for abdominal pain, blood in stool and vomiting.   Endocrine: Negative for polydipsia, polyphagia and polyuria.   Genitourinary:  Negative for dysuria, frequency, hematuria and urgency.   Musculoskeletal:  Negative for back pain and neck pain.   Skin:  Negative for rash and wound.   Allergic/Immunologic: Negative for immunocompromised state.   Neurological:  Negative for dizziness, syncope and headaches.   Hematological:  Negative for adenopathy. Does not bruise/bleed easily.   Psychiatric/Behavioral:  Negative for confusion and hallucinations.        Objective   Visit Vitals  /70   Pulse 73   Temp 36.3 °C (97.3 °F)      BP Readings from Last 3 Encounters:   11/14/23 104/70   07/06/23 109/74   06/22/23 111/75     Wt Readings from Last 3 Encounters:   11/14/23 53.8 kg (118 lb 9.6 oz)   07/06/23 49.9 kg (110 lb)   06/22/23 52.2 kg (115 lb)           Physical Exam  Constitutional:       General: She is not in acute distress.     Appearance: Normal appearance. She is not ill-appearing.   HENT:      Head: Normocephalic.      Right Ear: Tympanic membrane, ear canal and external ear normal.      Left Ear: Tympanic membrane,  ear canal and external ear normal.      Nose: Nose normal.      Mouth/Throat:      Mouth: Mucous membranes are moist.      Pharynx: Oropharynx is clear.   Eyes:      Extraocular Movements: Extraocular movements intact.      Conjunctiva/sclera: Conjunctivae normal.      Pupils: Pupils are equal, round, and reactive to light.   Cardiovascular:      Rate and Rhythm: Normal rate and regular rhythm.      Heart sounds: Normal heart sounds. No murmur heard.  Pulmonary:      Effort: Pulmonary effort is normal. No respiratory distress.      Breath sounds: Normal breath sounds. No wheezing, rhonchi or rales.   Abdominal:      General: Bowel sounds are normal.      Palpations: Abdomen is soft. There is no mass.      Tenderness: There is no abdominal tenderness.   Musculoskeletal:         General: No swelling or tenderness. Normal range of motion.      Cervical back: Normal range of motion and neck supple.      Right lower leg: No edema.      Left lower leg: No edema.   Skin:     General: Skin is warm.      Findings: No rash.   Neurological:      General: No focal deficit present.      Mental Status: She is alert and oriented to person, place, and time.      Cranial Nerves: No cranial nerve deficit.      Motor: No weakness.   Psychiatric:         Mood and Affect: Mood normal.         Behavior: Behavior normal.       Assessment/Plan   Diagnoses and all orders for this visit:  Routine adult health maintenance  -     Comprehensive Metabolic Panel; Future  Body mass index (BMI) 19.9 or less, adult  Anxiety  -     sertraline (Zoloft) 25 mg tablet; Take 1 tablet (25 mg) by mouth once daily.  Other orders  -     Follow Up In Primary Care  -     Flu vaccine (IIV4) age 6 months and greater, preservative free  -     Follow Up In Primary Care - Health Maintenance; Future

## 2023-11-14 ENCOUNTER — OFFICE VISIT (OUTPATIENT)
Dept: PRIMARY CARE | Facility: CLINIC | Age: 33
End: 2023-11-14
Payer: COMMERCIAL

## 2023-11-14 VITALS
HEART RATE: 73 BPM | SYSTOLIC BLOOD PRESSURE: 104 MMHG | BODY MASS INDEX: 19.06 KG/M2 | DIASTOLIC BLOOD PRESSURE: 70 MMHG | HEIGHT: 66 IN | OXYGEN SATURATION: 99 % | WEIGHT: 118.6 LBS | TEMPERATURE: 97.3 F

## 2023-11-14 DIAGNOSIS — F41.9 ANXIETY: ICD-10-CM

## 2023-11-14 DIAGNOSIS — Z00.00 ROUTINE ADULT HEALTH MAINTENANCE: Primary | ICD-10-CM

## 2023-11-14 PROCEDURE — 99395 PREV VISIT EST AGE 18-39: CPT | Performed by: NURSE PRACTITIONER

## 2023-11-14 PROCEDURE — 3008F BODY MASS INDEX DOCD: CPT | Performed by: NURSE PRACTITIONER

## 2023-11-14 PROCEDURE — 90471 IMMUNIZATION ADMIN: CPT | Performed by: NURSE PRACTITIONER

## 2023-11-14 PROCEDURE — 1036F TOBACCO NON-USER: CPT | Performed by: NURSE PRACTITIONER

## 2023-11-14 PROCEDURE — 90686 IIV4 VACC NO PRSV 0.5 ML IM: CPT | Performed by: NURSE PRACTITIONER

## 2023-11-14 RX ORDER — SERTRALINE HYDROCHLORIDE 25 MG/1
25 TABLET, FILM COATED ORAL DAILY
Qty: 90 TABLET | Refills: 4 | Status: SHIPPED | OUTPATIENT
Start: 2023-11-14

## 2023-11-14 ASSESSMENT — PATIENT HEALTH QUESTIONNAIRE - PHQ9
SUM OF ALL RESPONSES TO PHQ9 QUESTIONS 1 AND 2: 0
1. LITTLE INTEREST OR PLEASURE IN DOING THINGS: NOT AT ALL
4. FEELING TIRED OR HAVING LITTLE ENERGY: NOT AT ALL
2. FEELING DOWN, DEPRESSED OR HOPELESS: NOT AT ALL
8. MOVING OR SPEAKING SO SLOWLY THAT OTHER PEOPLE COULD HAVE NOTICED. OR THE OPPOSITE, BEING SO FIGETY OR RESTLESS THAT YOU HAVE BEEN MOVING AROUND A LOT MORE THAN USUAL: NOT AT ALL
9. THOUGHTS THAT YOU WOULD BE BETTER OFF DEAD, OR OF HURTING YOURSELF: NOT AT ALL
1. LITTLE INTEREST OR PLEASURE IN DOING THINGS: NOT AT ALL
SUM OF ALL RESPONSES TO PHQ9 QUESTIONS 1 AND 2: 0
2. FEELING DOWN, DEPRESSED OR HOPELESS: NOT AT ALL
7. TROUBLE CONCENTRATING ON THINGS, SUCH AS READING THE NEWSPAPER OR WATCHING TELEVISION: NOT AT ALL
6. FEELING BAD ABOUT YOURSELF - OR THAT YOU ARE A FAILURE OR HAVE LET YOURSELF OR YOUR FAMILY DOWN: NOT AT ALL
SUM OF ALL RESPONSES TO PHQ QUESTIONS 1-9: 0
5. POOR APPETITE OR OVEREATING: NOT AT ALL
3. TROUBLE FALLING OR STAYING ASLEEP OR SLEEPING TOO MUCH: NOT AT ALL

## 2023-11-14 ASSESSMENT — ANXIETY QUESTIONNAIRES
IF YOU CHECKED OFF ANY PROBLEMS ON THIS QUESTIONNAIRE, HOW DIFFICULT HAVE THESE PROBLEMS MADE IT FOR YOU TO DO YOUR WORK, TAKE CARE OF THINGS AT HOME, OR GET ALONG WITH OTHER PEOPLE: NOT DIFFICULT AT ALL
4. TROUBLE RELAXING: NOT AT ALL
2. NOT BEING ABLE TO STOP OR CONTROL WORRYING: NOT AT ALL
3. WORRYING TOO MUCH ABOUT DIFFERENT THINGS: NOT AT ALL
7. FEELING AFRAID AS IF SOMETHING AWFUL MIGHT HAPPEN: NOT AT ALL
GAD7 TOTAL SCORE: 0
1. FEELING NERVOUS, ANXIOUS, OR ON EDGE: NOT AT ALL
5. BEING SO RESTLESS THAT IT IS HARD TO SIT STILL: NOT AT ALL
6. BECOMING EASILY ANNOYED OR IRRITABLE: NOT AT ALL

## 2023-11-14 NOTE — PATIENT INSTRUCTIONS
Med refilled. The number of refills on the meds match when you need to return to the office for an appt. I recommend making your next appt today so you don't run out of your medication as it may take me up to 3 days to refill it.    Handouts given to pt:  physical handout        Labs:    You can use the lab in our building when fasting. The hrs are: Monday-Friday, 7 a.m. - 5 p.m., Saturday 8 a.m. - 12 noon.   No appt needed, BUT YOU DO NEED THE PAPER ORDER.    Fasting is no food, drink, gum or mints other than water for 12 hrs.   Results will be back in 2-3 business days for most labs. It is always recommended for any orders (labs, xrays, ultrasounds,MRI, ct scan, procedures etc) to check with your insurance provider for expected costs or expenses to you.         You will get your results via phone from my medical assistant if you do not have MyChart.  OR  You will get your results via YouFigt    If a result is urgent, I will call to speak to you.    Vaccines:  ---- flu vaccine today        General recommendations:  Exercise-cardio 4-5d/wk 30min each day  Diet-Breakfast-toast (my favorite Roro Ibarra Delighful Multigrain or Sander's Killer Bread Good Seed thin-sliced)/bagel/English muffin-whole wheat flour as a 1st ingredient or cereal/oatmeal/granola bar-fiber 4g or more or protein like eggs or peanut butter; optional veggies  Lunch-protein, 1/2c carb or 2 slices bread, veg 1c  Dinner-protein, fist sized carb, veg 1c  Fruit 2 a day  Dairy 2 a day-milk, soy milk, almond milk, cheese, yogurt, cottage cheese  Snacks-Protein-hard boiled egg, nuts (walnuts/almonds/pecans/pistachios 1/4c), hummus, beef/deer jerky or meat sticks; vegetable, fruit, dairy-milk(1%, skim, almond, soy)/cheese (not a lot of cheddar)/yogurt (Greek is best-my favorite Dannon Fruit on the Bottom Greek)/cottage cheese 2%; triscuits/ popcorn/wheat thins have a lot of fiber; follow serving size on bag/box/container  increase water  Limit alcohol to 1  drink per day for women and 2 drinks per day for men (1 drink=12oz beer or 5oz wine or 1 1/2oz liquor)  Calcium: 500mg 1 twice a day if age 50 and younger and 600mg 1 twice a day if over age 50 (calcium citrate can be taken without food)  Vitamin D: 800-5000 IU/day  Limit salt to <2300mg a day if age 50 and under and <1500mg a day if over age 50/have high bp or diabetes or kidney disease  Recommend folate for childbearing age women 0.4mg per day (can be found in a multivitamin)  Recommend 18mg/dL of iron a day if age 50 and under and 8mg/dL a day if over age 50; take on an empty stomach at bedtime  Use sunscreen   Wear seatbelt  Recommend safe sex practices: using condoms everytime you have sex, discuss with a new partner about their past partners/history of STDs/drug use, avoid drinking alcohol or using drugs as this increases the chance that you will participate in high-risk sex, for oral sex help protect your mouth by having your partner use a condom (male or female), women should not douche after sex, be aware of your partner's body and your body-look for signs of a sore, blister, rash, or discharge, and have regular exams and periodic tests for STDs.  No distracted driving  No driving when under influence of substances  Wear a seatbelt  Eye dr every 1-2yrs  Dentist every 6-12 mon  Tetanus shot every 10yrs  Recommend flu vaccine in the fall  Appt in  1 year for physical      I will communicate with you via [email or web address] regarding messages and results. If you need help with this, you can call the support line at [phone].    IT WAS A PLEASURE TO SEE YOU TODAY. THANK YOU FOR CHOOSING US FOR YOUR HEALTHCARE NEEDS.

## 2024-03-14 NOTE — TELEPHONE ENCOUNTER
Pt called and said she took her last antibiotic pill this morning for her uti and she said she had symptoms up until yesterday she is wondering if she needs to get retested for the uti or what you recommend.    See palliative care

## 2024-07-17 ENCOUNTER — APPOINTMENT (OUTPATIENT)
Dept: PRIMARY CARE | Facility: CLINIC | Age: 34
End: 2024-07-17
Payer: COMMERCIAL

## 2024-07-17 DIAGNOSIS — J45.20 MILD INTERMITTENT REACTIVE AIRWAY DISEASE WITHOUT COMPLICATION (HHS-HCC): ICD-10-CM

## 2024-07-17 RX ORDER — ALBUTEROL SULFATE 90 UG/1
2 AEROSOL, METERED RESPIRATORY (INHALATION) EVERY 6 HOURS PRN
Qty: 18 G | Refills: 1 | Status: SHIPPED | OUTPATIENT
Start: 2024-07-17

## 2024-12-02 ENCOUNTER — PATIENT MESSAGE (OUTPATIENT)
Dept: PRIMARY CARE | Facility: CLINIC | Age: 34
End: 2024-12-02
Payer: COMMERCIAL

## 2024-12-02 DIAGNOSIS — F41.9 ANXIETY: ICD-10-CM

## 2024-12-02 RX ORDER — SERTRALINE HYDROCHLORIDE 25 MG/1
25 TABLET, FILM COATED ORAL DAILY
Qty: 90 TABLET | Refills: 0 | Status: SHIPPED | OUTPATIENT
Start: 2024-12-02

## 2025-01-31 ENCOUNTER — APPOINTMENT (OUTPATIENT)
Dept: PRIMARY CARE | Facility: CLINIC | Age: 35
End: 2025-01-31
Payer: COMMERCIAL

## 2025-01-31 VITALS
HEART RATE: 95 BPM | OXYGEN SATURATION: 98 % | TEMPERATURE: 97.7 F | WEIGHT: 126 LBS | HEIGHT: 66 IN | BODY MASS INDEX: 20.25 KG/M2 | DIASTOLIC BLOOD PRESSURE: 71 MMHG | SYSTOLIC BLOOD PRESSURE: 119 MMHG

## 2025-01-31 DIAGNOSIS — F41.9 ANXIETY: ICD-10-CM

## 2025-01-31 DIAGNOSIS — Z00.00 ROUTINE ADULT HEALTH MAINTENANCE: Primary | ICD-10-CM

## 2025-01-31 PROCEDURE — 99395 PREV VISIT EST AGE 18-39: CPT | Performed by: NURSE PRACTITIONER

## 2025-01-31 PROCEDURE — 3008F BODY MASS INDEX DOCD: CPT | Performed by: NURSE PRACTITIONER

## 2025-01-31 PROCEDURE — 90471 IMMUNIZATION ADMIN: CPT | Performed by: NURSE PRACTITIONER

## 2025-01-31 PROCEDURE — 90656 IIV3 VACC NO PRSV 0.5 ML IM: CPT | Performed by: NURSE PRACTITIONER

## 2025-01-31 PROCEDURE — 1036F TOBACCO NON-USER: CPT | Performed by: NURSE PRACTITIONER

## 2025-01-31 RX ORDER — SERTRALINE HYDROCHLORIDE 25 MG/1
25 TABLET, FILM COATED ORAL DAILY
Qty: 90 TABLET | Refills: 4 | Status: SHIPPED | OUTPATIENT
Start: 2025-01-31

## 2025-01-31 RX ORDER — SERTRALINE HYDROCHLORIDE 25 MG/1
25 TABLET, FILM COATED ORAL DAILY
Qty: 90 TABLET | Refills: 0 | Status: SHIPPED | OUTPATIENT
Start: 2025-01-31 | End: 2025-01-31 | Stop reason: SDUPTHER

## 2025-01-31 ASSESSMENT — ENCOUNTER SYMPTOMS
PALPITATIONS: 0
HEMATURIA: 0
WOUND: 0
FREQUENCY: 0
FATIGUE: 0
BLOOD IN STOOL: 0
ADENOPATHY: 0
SORE THROAT: 0
CHILLS: 0
DIZZINESS: 0
POLYPHAGIA: 0
EYE DISCHARGE: 0
ABDOMINAL PAIN: 0
HALLUCINATIONS: 0
TROUBLE SWALLOWING: 0
EYE PAIN: 0
CONFUSION: 0
BRUISES/BLEEDS EASILY: 0
POLYDIPSIA: 0
HEADACHES: 0
FEVER: 0
VOMITING: 0
BACK PAIN: 0
APPETITE CHANGE: 0
DYSURIA: 0
UNEXPECTED WEIGHT CHANGE: 0
NECK PAIN: 0
SHORTNESS OF BREATH: 0
EYE REDNESS: 0
COUGH: 0

## 2025-01-31 ASSESSMENT — ANXIETY QUESTIONNAIRES
6. BECOMING EASILY ANNOYED OR IRRITABLE: SEVERAL DAYS
4. TROUBLE RELAXING: NOT AT ALL
GAD7 TOTAL SCORE: 2
3. WORRYING TOO MUCH ABOUT DIFFERENT THINGS: SEVERAL DAYS
IF YOU CHECKED OFF ANY PROBLEMS ON THIS QUESTIONNAIRE, HOW DIFFICULT HAVE THESE PROBLEMS MADE IT FOR YOU TO DO YOUR WORK, TAKE CARE OF THINGS AT HOME, OR GET ALONG WITH OTHER PEOPLE: NOT DIFFICULT AT ALL
5. BEING SO RESTLESS THAT IT IS HARD TO SIT STILL: NOT AT ALL
1. FEELING NERVOUS, ANXIOUS, OR ON EDGE: NOT AT ALL
2. NOT BEING ABLE TO STOP OR CONTROL WORRYING: NOT AT ALL
7. FEELING AFRAID AS IF SOMETHING AWFUL MIGHT HAPPEN: NOT AT ALL

## 2025-01-31 ASSESSMENT — PATIENT HEALTH QUESTIONNAIRE - PHQ9
3. TROUBLE FALLING OR STAYING ASLEEP OR SLEEPING TOO MUCH: NOT AT ALL
SUM OF ALL RESPONSES TO PHQ QUESTIONS 1-9: 1
4. FEELING TIRED OR HAVING LITTLE ENERGY: SEVERAL DAYS
2. FEELING DOWN, DEPRESSED OR HOPELESS: NOT AT ALL
9. THOUGHTS THAT YOU WOULD BE BETTER OFF DEAD, OR OF HURTING YOURSELF: NOT AT ALL
5. POOR APPETITE OR OVEREATING: NOT AT ALL
8. MOVING OR SPEAKING SO SLOWLY THAT OTHER PEOPLE COULD HAVE NOTICED. OR THE OPPOSITE, BEING SO FIGETY OR RESTLESS THAT YOU HAVE BEEN MOVING AROUND A LOT MORE THAN USUAL: NOT AT ALL
1. LITTLE INTEREST OR PLEASURE IN DOING THINGS: NOT AT ALL
10. IF YOU CHECKED OFF ANY PROBLEMS, HOW DIFFICULT HAVE THESE PROBLEMS MADE IT FOR YOU TO DO YOUR WORK, TAKE CARE OF THINGS AT HOME, OR GET ALONG WITH OTHER PEOPLE: NOT DIFFICULT AT ALL
7. TROUBLE CONCENTRATING ON THINGS, SUCH AS READING THE NEWSPAPER OR WATCHING TELEVISION: NOT AT ALL
6. FEELING BAD ABOUT YOURSELF - OR THAT YOU ARE A FAILURE OR HAVE LET YOURSELF OR YOUR FAMILY DOWN: NOT AT ALL
SUM OF ALL RESPONSES TO PHQ9 QUESTIONS 1 AND 2: 0

## 2025-01-31 NOTE — PATIENT INSTRUCTIONS
Med refilled. The number of refills on the meds match when you need to return to the office for an appt. I recommend making your next appt today so you don't run out of your medication as it may take me up to 3 days to refill it.    Handouts given to pt:  physical handout      Labs- No appt needed:    You can use the lab in our building when fasting. The hrs are: Mon-Fri 7a-330p.  No Saturday hrs. Bring the paper order.   OR   [facility] Mon-Fri 7a-12p. No Saturday hrs. Bring the paper order.  OR   [facility] Mon-Fri 630a-530p or Sat 6:30a-12p. Bring the paper order  OR  [facility] Mon-FrI 7a-5p  [facility] Mon-Fri 730a-4p, Sat  8a-12p  [facility] [address] Mon-Thurs 630a-6p , Fri 630a-4p, Sat 8a-12p  [facility] [address] Mon-Fri 7a-630p and Sat. 7a-3p    Fasting is no food, drink, gum or mints other than water for 12 hrs.   Results will be back in 2-3 business days for most labs. It is always recommended for any orders (labs, xrays, ultrasounds,MRI, ct scan, procedures etc) to check with your insurance provider for expected costs or expenses to you.         You will get your results via phone from my medical assistant if you do not have MyChart.  OR  You will get your results via [employer]    If a result is urgent, I will call to speak to you.    Vaccines:    Flu shot today    General recommendations:  Exercise-cardio 4-5d/wk 30min each day  Diet-Breakfast-toast (my favorite Roro Ibarra Delighful Multigrain or Sander's Killer Bread Good Seed thin-sliced)/bagel/English muffin-whole wheat flour as a 1st ingredient or cereal/oatmeal/granola bar-fiber 4g or more or protein like eggs or peanut butter; optional veggies  Lunch-protein, 1/2c carb or 2 slices bread, veg 1c  Dinner-protein, fist sized carb, veg 1c  Fruit 2 a day  Dairy 2 a day-milk, soy milk, almond milk, cheese, yogurt, cottage cheese  Snacks-Protein-hard boiled egg, nuts  (walnuts/almonds/pecans/pistachios 1/4c), hummus, beef/deer jerky or meat sticks; vegetable, fruit, dairy-milk(1%, skim, almond, soy)/cheese (not a lot of cheddar)/yogurt (Greek is best-my favorite Dannon Fruit on the Bottom Greek)/cottage cheese 2%; triscuits/ popcorn/wheat thins have a lot of fiber; follow serving size on bag/box/container  increase water  Limit alcohol to 1 drink per day for women and 2 drinks per day for men (1 drink=12oz beer or 5oz wine or 1 1/2oz liquor)  Calcium: 500mg 1 twice a day if age 50 and younger and 600mg 1 twice a day if over age 50 (calcium citrate can be taken without food)  Vitamin D: 800-5000 IU/day  Limit salt to <2300mg a day if age 50 and under and <1500mg a day if over age 50/have high bp or diabetes or kidney disease  Recommend folate for childbearing age women 0.4mg per day (can be found in a multivitamin)  Recommend 18mg/dL of iron a day if age 50 and under and 8mg/dL a day if over age 50; take on an empty stomach at bedtime  Use sunscreen   Wear seatbelt  Recommend safe sex practices: using condoms everytime you have sex, discuss with a new partner about their past partners/history of STDs/drug use, avoid drinking alcohol or using drugs as this increases the chance that you will participate in high-risk sex, for oral sex help protect your mouth by having your partner use a condom (male or female), women should not douche after sex, be aware of your partner's body and your body-look for signs of a sore, blister, rash, or discharge, and have regular exams and periodic tests for STDs.  No distracted driving  No driving when under influence of substances  Wear a seatbelt  Eye dr every 1-2yrs  Dentist every 6-12 mon  Tetanus shot every 10yrs  Recommend flu vaccine in the fall  Appt in 1 year for physical      I will communicate with you via InboxF[employer]hart regarding messages and results. If you need help with this, you can call the support line at [phone].    IT WAS A PLEASURE  TO SEE YOU TODAY. THANK YOU FOR CHOOSING US FOR YOUR HEALTHCARE NEEDS.

## 2025-01-31 NOTE — PROGRESS NOTES
Subjective   Patient ID: Lorraine Mendenhall is a 34 y.o. female who presents for Annual Exam.      Is pt fasting?  No   Does pt see any providers other than care team below:   Ob gyn, [facility]    Any forms to fill out? No   Does pt want flu shot? Yes   Last albuterol inh use-1 month ago   Any questions or concerns?  No     Phq9=1  , gad7=2    No care team member to display    HPI  Last labs-7/2023 liver nl, cbc nl; 6/2023 bmp k low; 5/2023 lipid nl, tsh nl  Due for labs- all    Cholesterol   Date Value Ref Range Status   01/23/2018 174 0 - 199 mg/dL Final     Comment:     .      AGE      DESIRABLE   BORDERLINE HIGH   HIGH     0-19 Y     0 - 169       170 - 199     >/= 200    20-24 Y     0 - 189       190 - 224     >/= 225         >24 Y     0 - 199       200 - 239     >/= 240   **All ranges are based on fasting samples. Specific   therapeutic targets will vary based on patient-specific   cardiac risk.  .   Pediatric guidelines reference:Pediatrics 2011, 128(S5).   Adult guidelines reference: NCEP ATPIII Guidelines,     PHIL 2001, 258:2486-97  .   Venipuncture immediately after or during the    administration of Metamizole may lead to falsely   low results. Testing should be performed immediately   prior to Metamizole dosing.       Triglycerides   Date Value Ref Range Status   01/23/2018 129 0 - 149 mg/dL Final     Comment:     .      AGE      DESIRABLE   BORDERLINE HIGH   HIGH     VERY HIGH   0 D-90 D    19 - 174         ----         ----        ----  91 D- 9 Y     0 -  74        75 -  99     >/= 100      ----    10-19 Y     0 -  89        90 - 129     >/= 130      ----    20-24 Y     0 - 114       115 - 149     >/= 150      ----         >24 Y     0 - 149       150 - 199    200- 499    >/= 500  .   Venipuncture immediately after or during the    administration of Metamizole may lead to falsely   low results. Testing should be performed immediately   prior to Metamizole dosing.       HDL   Date Value Ref Range Status  "  01/23/2018 66.1 mg/dL Final     Comment:     .      AGE      VERY LOW   LOW     NORMAL    HIGH       0-19 Y       < 35   < 40     40-45     ----    20-24 Y       ----   < 40       >45     ----      >24 Y       ----   < 40     40-60      >60  .       No results found for: \"LDL\"  TSH   Date Value Ref Range Status   05/03/2021 2.99 0.44 - 3.98 mIU/L Final     Comment:      TSH testing is performed using different testing    methodology at [facility] than at other    [facility] hospitals. Direct result comparisons should    only be made within the same method.       No results found for: \"A1C\"  No components found for: \"POCA1C\"  No results found for: \"ALBUR\"  No components found for: \"POCALBUR\"      Other concerns:sertraline -doing well on dose    bps at home- none    ER/urgicare visits in the last year-none  Hospitalizations in the last year- none      last Pap- 11/11/24; due 11/2029  H/o abn pap-8yrs ago    FH ovarian, endometrial, cervical, uterine ca-none    Current birth control method-none  No change in contraception desired      FH br ca-none      FH colon ca-dad-age 67      Exercise-3d/wk and chasing kids  Diet- 3 meals a day   Body mass index is 20.34 kg/m².      last dental appt- aug 2024    BMs-regular  Sleep-able to fall asleep and stay asleep; no snoring or apnea  no cp, swelling, sob, abd pain, n/v/d/c, blood in stool or black stools  STI testing including hiv (age 15-65) and hep c screening (18-79)-declines        Immunization History   Administered Date(s) Administered    Flu vaccine (IIV4), preservative free *Check age/dose* 11/14/2023    Pfizer Purple Cap SARS-CoV-2 01/22/2021, 02/12/2021    Tdap vaccine, age 7 year and older (BOOSTRIX, ADACEL) 06/09/2020    Varicella vaccine, subcutaneous (VARIVAX) 05/22/2023     Flu shot-today    fractures in lifetime-wrist  Fh osteoporosis-mom    FH heart attack, heart surgery-bypass-mat gf  FH stroke-none    The ASCVD Risk score (Zacarias RAMOS, et al., " 2019) failed to calculate for the following reasons:    The 2019 ASCVD risk score is only valid for ages 40 to 79  Coronary Artery Calcium score:  This test is recommended for men 45 or older and women 55 or older without a history of heart disease and have 1 risk factor (high LDL cholesterol, low HDL cholesterol, high blood pressure, smoker (current or past), type 2 diabetes, IBD, lupus, RA, ankylosing spondylitis, psoriasis or family history of  heart disease <55yrs in dad, brother or child or <65yrs in mom, sister, or child.)       Review of Systems   Constitutional:  Negative for appetite change, chills, fatigue, fever and unexpected weight change.   HENT:  Negative for congestion, ear pain, sore throat and trouble swallowing.    Eyes:  Negative for pain, discharge and redness.   Respiratory:  Negative for cough and shortness of breath.    Cardiovascular:  Negative for chest pain and palpitations.   Gastrointestinal:  Negative for abdominal pain, blood in stool and vomiting.   Endocrine: Negative for polydipsia, polyphagia and polyuria.   Genitourinary:  Negative for dysuria, frequency, hematuria and urgency.   Musculoskeletal:  Negative for back pain and neck pain.   Skin:  Negative for rash and wound.   Allergic/Immunologic: Negative for immunocompromised state.   Neurological:  Negative for dizziness, syncope and headaches.   Hematological:  Negative for adenopathy. Does not bruise/bleed easily.   Psychiatric/Behavioral:  Negative for confusion and hallucinations.        Objective   Visit Vitals  /71   Pulse 95   Temp 36.5 °C (97.7 °F)        BP Readings from Last 3 Encounters:   01/31/25 119/71   11/14/23 104/70   07/06/23 109/74     Wt Readings from Last 3 Encounters:   01/31/25 57.2 kg (126 lb)   11/14/23 53.8 kg (118 lb 9.6 oz)   07/06/23 49.9 kg (110 lb)           Physical Exam  Constitutional:       General: She is not in acute distress.     Appearance: Normal appearance. She is not ill-appearing.    HENT:      Head: Normocephalic.      Right Ear: Tympanic membrane, ear canal and external ear normal.      Left Ear: Tympanic membrane, ear canal and external ear normal.      Nose: Nose normal.      Mouth/Throat:      Mouth: Mucous membranes are moist.      Pharynx: Oropharynx is clear.   Eyes:      Extraocular Movements: Extraocular movements intact.      Conjunctiva/sclera: Conjunctivae normal.      Pupils: Pupils are equal, round, and reactive to light.   Cardiovascular:      Rate and Rhythm: Normal rate and regular rhythm.      Heart sounds: Normal heart sounds. No murmur heard.  Pulmonary:      Effort: Pulmonary effort is normal. No respiratory distress.      Breath sounds: Normal breath sounds. No wheezing, rhonchi or rales.   Abdominal:      General: Bowel sounds are normal.      Palpations: Abdomen is soft. There is no mass.      Tenderness: There is no abdominal tenderness.   Musculoskeletal:         General: No swelling or tenderness. Normal range of motion.      Cervical back: Normal range of motion and neck supple.      Right lower leg: No edema.      Left lower leg: No edema.   Skin:     General: Skin is warm.      Findings: No rash.   Neurological:      General: No focal deficit present.      Mental Status: She is alert and oriented to person, place, and time.      Cranial Nerves: No cranial nerve deficit.      Motor: No weakness.   Psychiatric:         Mood and Affect: Mood normal.         Behavior: Behavior normal.       Assessment/Plan   Diagnoses and all orders for this visit:  Routine adult health maintenance  -     Comprehensive Metabolic Panel; Future  -     CBC and Auto Differential; Future  -     Lipid Panel; Future  -     TSH with reflex to Free T4 if abnormal; Future  Anxiety  -     sertraline (Zoloft) 25 mg tablet; Take 1 tablet (25 mg) by mouth once daily.  Other orders  -     Flu vaccine, trivalent, preservative free, age 6 months and greater (Fluarix/Fluzone/Flulaval)

## 2025-04-25 LAB
NON-UH HIE A/G RATIO: 1
NON-UH HIE ALB: 3.7 G/DL (ref 3.4–5)
NON-UH HIE ALK PHOS: 38 UNIT/L (ref 45–117)
NON-UH HIE BASO COUNT: 0.02 X1000 (ref 0–0.2)
NON-UH HIE BASOS %: 0.6 %
NON-UH HIE BILIRUBIN, TOTAL: 0.8 MG/DL (ref 0.3–1.2)
NON-UH HIE BUN/CREAT RATIO: 8.9
NON-UH HIE BUN: 8 MG/DL (ref 9–23)
NON-UH HIE CALCIUM: 9.3 MG/DL (ref 8.7–10.4)
NON-UH HIE CALCULATED LDL CHOLESTEROL: 87 MG/DL (ref 60–130)
NON-UH HIE CALCULATED OSMOLALITY: 284 MOSM/KG (ref 275–295)
NON-UH HIE CHLORIDE: 104 MMOL/L (ref 98–107)
NON-UH HIE CHOLESTEROL: 153 MG/DL (ref 100–200)
NON-UH HIE CO2, VENOUS: 25 MMOL/L (ref 20–31)
NON-UH HIE CREATININE: 0.9 MG/DL (ref 0.5–0.8)
NON-UH HIE DIFF?: ABNORMAL
NON-UH HIE EOS COUNT: 0.07 X1000 (ref 0–0.5)
NON-UH HIE EOSIN %: 2 %
NON-UH HIE GFR AA: >60
NON-UH HIE GLOBULIN: 3.6 G/DL
NON-UH HIE GLOMERULAR FILTRATION RATE: >60 ML/MIN/1.73M?
NON-UH HIE GLUCOSE: 85 MG/DL (ref 74–106)
NON-UH HIE GOT: 17 UNIT/L (ref 15–37)
NON-UH HIE GPT: 8 UNIT/L (ref 10–49)
NON-UH HIE HCT: 37.9 % (ref 36–46)
NON-UH HIE HDL CHOLESTEROL: 54 MG/DL (ref 40–60)
NON-UH HIE HGB: 12.9 G/DL (ref 12–16)
NON-UH HIE INSTR WBC: 3.4
NON-UH HIE K: 3.8 MMOL/L (ref 3.5–5.1)
NON-UH HIE LYMPH %: 43.6 %
NON-UH HIE LYMPH COUNT: 1.49 X1000 (ref 1.2–4.8)
NON-UH HIE MCH: 31.6 PG (ref 27–34)
NON-UH HIE MCHC: 33.9 G/DL (ref 32–37)
NON-UH HIE MCV: 93.1 FL (ref 80–100)
NON-UH HIE MONO %: 8.5 %
NON-UH HIE MONO COUNT: 0.29 X1000 (ref 0.1–1)
NON-UH HIE MPV: 9.6 FL (ref 7.4–10.4)
NON-UH HIE NA: 144 MMOL/L (ref 135–145)
NON-UH HIE NEUTROPHIL %: 45.4 %
NON-UH HIE NEUTROPHIL COUNT (ANC): 1.55 X1000 (ref 1.4–8.8)
NON-UH HIE NUCLEATED RBC: 0 /100WBC
NON-UH HIE PLATELET: 238 X10 (ref 150–450)
NON-UH HIE RBC: 4.07 X10 (ref 4.2–5.4)
NON-UH HIE RDW: 13.2 % (ref 11.5–14.5)
NON-UH HIE TOTAL CHOL/HDL CHOL RATIO: 2.8
NON-UH HIE TOTAL PROTEIN: 7.3 G/DL (ref 5.7–8.2)
NON-UH HIE TRIGLYCERIDES: 58 MG/DL (ref 30–150)
NON-UH HIE TSH: 2.44 UIU/ML (ref 0.55–4.78)
NON-UH HIE WBC: 3.4 X10 (ref 4.5–11)

## 2025-04-27 ASSESSMENT — ENCOUNTER SYMPTOMS
SORE THROAT: 0
RHINORRHEA: 0
ABDOMINAL PAIN: 0
COUGH: 0
DIARRHEA: 1
VOMITING: 0
HEADACHES: 1
NECK PAIN: 1

## 2025-04-28 ENCOUNTER — APPOINTMENT (OUTPATIENT)
Dept: PRIMARY CARE | Facility: CLINIC | Age: 35
End: 2025-04-28
Payer: COMMERCIAL

## 2025-04-28 VITALS
DIASTOLIC BLOOD PRESSURE: 78 MMHG | HEART RATE: 100 BPM | TEMPERATURE: 98 F | WEIGHT: 123.6 LBS | SYSTOLIC BLOOD PRESSURE: 127 MMHG | BODY MASS INDEX: 19.86 KG/M2 | HEIGHT: 66 IN

## 2025-04-28 DIAGNOSIS — J01.00 ACUTE NON-RECURRENT MAXILLARY SINUSITIS: ICD-10-CM

## 2025-04-28 DIAGNOSIS — D72.819 LEUKOPENIA, UNSPECIFIED TYPE: Primary | ICD-10-CM

## 2025-04-28 DIAGNOSIS — K21.9 GASTROESOPHAGEAL REFLUX DISEASE, UNSPECIFIED WHETHER ESOPHAGITIS PRESENT: ICD-10-CM

## 2025-04-28 DIAGNOSIS — R19.7 DIARRHEA, UNSPECIFIED TYPE: ICD-10-CM

## 2025-04-28 DIAGNOSIS — D64.9 ANEMIA, UNSPECIFIED TYPE: ICD-10-CM

## 2025-04-28 DIAGNOSIS — R53.83 FATIGUE, UNSPECIFIED TYPE: Primary | ICD-10-CM

## 2025-04-28 PROCEDURE — 99214 OFFICE O/P EST MOD 30 MIN: CPT | Performed by: NURSE PRACTITIONER

## 2025-04-28 PROCEDURE — 1036F TOBACCO NON-USER: CPT | Performed by: NURSE PRACTITIONER

## 2025-04-28 PROCEDURE — 3008F BODY MASS INDEX DOCD: CPT | Performed by: NURSE PRACTITIONER

## 2025-04-28 RX ORDER — PANTOPRAZOLE SODIUM 40 MG/1
40 TABLET, DELAYED RELEASE ORAL DAILY
Qty: 30 TABLET | Refills: 1 | Status: SHIPPED | OUTPATIENT
Start: 2025-04-28 | End: 2025-06-27

## 2025-04-28 RX ORDER — DOXYCYCLINE 100 MG/1
100 CAPSULE ORAL 2 TIMES DAILY
Qty: 20 CAPSULE | Refills: 0 | Status: SHIPPED | OUTPATIENT
Start: 2025-04-28 | End: 2025-05-08

## 2025-04-28 ASSESSMENT — PATIENT HEALTH QUESTIONNAIRE - PHQ9
2. FEELING DOWN, DEPRESSED OR HOPELESS: NOT AT ALL
SUM OF ALL RESPONSES TO PHQ9 QUESTIONS 1 AND 2: 0
7. TROUBLE CONCENTRATING ON THINGS, SUCH AS READING THE NEWSPAPER OR WATCHING TELEVISION: SEVERAL DAYS
SUM OF ALL RESPONSES TO PHQ QUESTIONS 1-9: 4
10. IF YOU CHECKED OFF ANY PROBLEMS, HOW DIFFICULT HAVE THESE PROBLEMS MADE IT FOR YOU TO DO YOUR WORK, TAKE CARE OF THINGS AT HOME, OR GET ALONG WITH OTHER PEOPLE: SOMEWHAT DIFFICULT
1. LITTLE INTEREST OR PLEASURE IN DOING THINGS: NOT AT ALL
5. POOR APPETITE OR OVEREATING: NOT AT ALL
4. FEELING TIRED OR HAVING LITTLE ENERGY: MORE THAN HALF THE DAYS
6. FEELING BAD ABOUT YOURSELF - OR THAT YOU ARE A FAILURE OR HAVE LET YOURSELF OR YOUR FAMILY DOWN: NOT AT ALL
3. TROUBLE FALLING OR STAYING ASLEEP OR SLEEPING TOO MUCH: SEVERAL DAYS
8. MOVING OR SPEAKING SO SLOWLY THAT OTHER PEOPLE COULD HAVE NOTICED. OR THE OPPOSITE, BEING SO FIGETY OR RESTLESS THAT YOU HAVE BEEN MOVING AROUND A LOT MORE THAN USUAL: NOT AT ALL
9. THOUGHTS THAT YOU WOULD BE BETTER OFF DEAD, OR OF HURTING YOURSELF: NOT AT ALL

## 2025-04-28 ASSESSMENT — ANXIETY QUESTIONNAIRES
1. FEELING NERVOUS, ANXIOUS, OR ON EDGE: SEVERAL DAYS
3. WORRYING TOO MUCH ABOUT DIFFERENT THINGS: NOT AT ALL
5. BEING SO RESTLESS THAT IT IS HARD TO SIT STILL: NOT AT ALL
4. TROUBLE RELAXING: NOT AT ALL
IF YOU CHECKED OFF ANY PROBLEMS ON THIS QUESTIONNAIRE, HOW DIFFICULT HAVE THESE PROBLEMS MADE IT FOR YOU TO DO YOUR WORK, TAKE CARE OF THINGS AT HOME, OR GET ALONG WITH OTHER PEOPLE: NOT DIFFICULT AT ALL
GAD7 TOTAL SCORE: 3
7. FEELING AFRAID AS IF SOMETHING AWFUL MIGHT HAPPEN: NOT AT ALL
2. NOT BEING ABLE TO STOP OR CONTROL WORRYING: SEVERAL DAYS
6. BECOMING EASILY ANNOYED OR IRRITABLE: SEVERAL DAYS

## 2025-04-28 ASSESSMENT — ENCOUNTER SYMPTOMS
BLOOD IN STOOL: 0
DIARRHEA: 1
VOMITING: 0
NAUSEA: 0
CONSTIPATION: 0
SORE THROAT: 0
NECK PAIN: 1
COUGH: 0
HEADACHES: 1
FATIGUE: 1
CHILLS: 0
ABDOMINAL PAIN: 0
RHINORRHEA: 0
SINUS PAIN: 1
FEVER: 0

## 2025-04-28 NOTE — PATIENT INSTRUCTIONS
Zyrtec 1 a day otc x 2wks then stop  If rt ear pressure comes back, restart    Antibiotic    Pantoprazole 1 a day x 2wks then stop  Restart if burning feeling returns    Labs today-iron labs, d and b12    Stool culture if above not helping in 3-4d  Next would be gi referral.    If fatigue continues, I will refer to sleep dr  If neck tenderness, facial pian, ear pressure continue, I will refer to ent    To ER if blood in stool or abd pain or fever/chills    Keep feb appt      I will communicate with you via [employer] regarding messages and results. If you need help with this, you can call the support line at [phone].    IT WAS A PLEASURE TO SEE YOU TODAY. THANK YOU FOR CHOOSING US FOR YOUR HEALTHCARE NEEDS.

## 2025-04-28 NOTE — PROGRESS NOTES
Subjective   Patient ID: Lorraine Mendenhall is a 34 y.o. female who presents for Follow-up.  Last physical: 1/31/25  Last labs- 4/2025  Sugar (aka glucose), kidney function, liver function and electrolytes in the CMP (comprehensive metabolic panel) were normal.  White blood cells low. Recheck in 2wks. No fasting or appt needed.   Red blood cells slightly low. I'll try to add on iron labs.  TSH (thyroid test) was normal.  All cholesterol labs normal.    Last albuterol inh use-couple months ago  current sx-Fatigue  when did sx start-last week had neck swelling, ear pressure, loose stools.  did pt take a covid-19 test? no  Any other questions or concerns that pt wants to discuss today? Worsening GERD, has not tried any OTC medications    Phq9=4  , gad7=3    Earache   There is pain in the right ear. This is a new problem. The current episode started in the past 7 days. The problem occurs constantly. The problem has been gradually worsening. There has been no fever. Associated symptoms include diarrhea, headaches and neck pain. Pertinent negatives include no abdominal pain, coughing, ear discharge, hearing loss, rash, rhinorrhea, sore throat or vomiting.     Worsening GERD x1mon, warm burning in mid chest; has not tried any OTC medications; belching occas    Fatigue, low energy x2mon; worse x 1wk  1wk ago neck tender and swollen lymph nodes, rt facial pain, rt ear pressure, loose or liquid stools  Vision more strained  No ear pain or drainage  No abd pain  No blood in stool  No gas, bloating, vomiting  No dizziness   No fall or injury  No arm numbness tingling or pain  No new meds or otc meds  No new stresses  No recent travel  No recent antibiotics  no cough, sob, wheezing, tight upper chest, fever, chills, ST, new loss of taste or smell, nausea, abdominal pain, weakness, red eye, rash, bruising, cyanosis,  runny nose, stuffy nose, post nasal drip, pain with deep breath, leg or foot swelling, calf  pain  Selftxt-tylenol  No one around pt with diarrhea, nausea, vomiting    Able to fall asleep and stay asleep  No gasping, stopping breathing at night  Has seasonal allergies-uses claritin as needed    No known exposure to COVID-19  No known exposure to strep  No known exposure to influenza  No one sick around the pt      Risk factors:  Chronic disease/comorbidities: htn, dm, cardiovascular disease, obesity, chronic lung disease, renal disease, immunocompromised state  not a healthcare worker  Age: 65yrs of age and older        No care team member to display     Review of Systems   Constitutional:  Positive for fatigue. Negative for chills and fever.   HENT:  Positive for ear pain and sinus pain. Negative for ear discharge, hearing loss, rhinorrhea and sore throat.         Rt ear pressure   Eyes:  Positive for visual disturbance.   Respiratory:  Negative for cough.    Gastrointestinal:  Positive for diarrhea. Negative for abdominal pain, blood in stool, constipation, nausea and vomiting.        Reflux noted   Musculoskeletal:  Positive for neck pain.   Skin:  Negative for rash.   Neurological:  Positive for headaches.       Objective   Visit Vitals  /78   Pulse 100   Temp 36.7 °C (98 °F)      BP Readings from Last 3 Encounters:   04/28/25 127/78   01/31/25 119/71   11/14/23 104/70     Wt Readings from Last 3 Encounters:   04/28/25 56.1 kg (123 lb 9.6 oz)   01/31/25 57.2 kg (126 lb)   11/14/23 53.8 kg (118 lb 9.6 oz)       Physical Exam  Constitutional:       Appearance: Normal appearance.   HENT:      Head: Normocephalic.      Right Ear: Tympanic membrane, ear canal and external ear normal.      Left Ear: Tympanic membrane, ear canal and external ear normal.      Nose: Nose normal.      Mouth/Throat:      Mouth: Mucous membranes are moist.      Pharynx: No oropharyngeal exudate or posterior oropharyngeal erythema.   Cardiovascular:      Rate and Rhythm: Normal rate and regular rhythm.      Heart sounds:  Normal heart sounds.   Pulmonary:      Effort: Pulmonary effort is normal.      Breath sounds: Normal breath sounds. No wheezing, rhonchi or rales.   Abdominal:      General: Abdomen is flat. Bowel sounds are normal.      Palpations: Abdomen is soft. There is no mass.      Tenderness: There is no abdominal tenderness. There is no guarding or rebound.   Lymphadenopathy:      Cervical: No cervical adenopathy.   Neurological:      Mental Status: She is alert.         Assessment/Plan   Diagnoses and all orders for this visit:  Fatigue, unspecified type  -     Vitamin D 25-Hydroxy,Total (for eval of Vitamin D levels); Future  -     Folate; Future  Diarrhea, unspecified type  -     Stool Pathogen Panel, PCR; Future  -     Ova/Para + Giardia/Cryptosporidium Antigen; Future  -     C. difficile, PCR; Future  Gastroesophageal reflux disease, unspecified whether esophagitis present  -     pantoprazole (ProtoNix) 40 mg EC tablet; Take 1 tablet (40 mg) by mouth once daily. Do not crush, chew, or split.  Acute non-recurrent maxillary sinusitis  -     doxycycline (Vibramycin) 100 mg capsule; Take 1 capsule (100 mg) by mouth 2 times a day for 10 days. Take with at least 8 ounces (large glass) of water, do not lie down for 30 minutes after        See patient instructions for full plan

## 2025-04-29 ENCOUNTER — PATIENT MESSAGE (OUTPATIENT)
Dept: PRIMARY CARE | Facility: CLINIC | Age: 35
End: 2025-04-29
Payer: COMMERCIAL

## 2025-04-29 DIAGNOSIS — E55.9 VITAMIN D DEFICIENCY: Primary | ICD-10-CM

## 2025-04-29 LAB
25(OH)D3+25(OH)D2 SERPL-MCNC: 25 NG/ML (ref 30–100)
FERRITIN SERPL-MCNC: 12 NG/ML (ref 16–154)
FOLATE SERPL-MCNC: >24 NG/ML
IRON SATN MFR SERPL: 19 % (CALC) (ref 16–45)
IRON SERPL-MCNC: 62 MCG/DL (ref 40–190)
TIBC SERPL-MCNC: 320 MCG/DL (CALC) (ref 250–450)
VIT B12 SERPL-MCNC: 262 PG/ML (ref 200–1100)

## 2025-04-29 RX ORDER — FERROUS SULFATE 325(65) MG
325 TABLET, DELAYED RELEASE (ENTERIC COATED) ORAL
COMMUNITY

## 2025-04-30 PROBLEM — E55.9 VITAMIN D DEFICIENCY: Status: ACTIVE | Noted: 2025-04-30

## 2025-04-30 RX ORDER — ERGOCALCIFEROL 1.25 MG/1
1.25 CAPSULE ORAL
Qty: 5 CAPSULE | Refills: 2 | Status: SHIPPED | OUTPATIENT
Start: 2025-05-04 | End: 2025-08-11

## 2025-05-02 ENCOUNTER — PATIENT MESSAGE (OUTPATIENT)
Dept: PRIMARY CARE | Facility: CLINIC | Age: 35
End: 2025-05-02
Payer: COMMERCIAL

## 2025-05-02 DIAGNOSIS — K21.9 GASTROESOPHAGEAL REFLUX DISEASE, UNSPECIFIED WHETHER ESOPHAGITIS PRESENT: Primary | ICD-10-CM

## 2025-05-02 DIAGNOSIS — R59.0 CERVICAL LYMPHADENOPATHY: ICD-10-CM

## 2025-05-12 DIAGNOSIS — D72.819 LEUKOPENIA, UNSPECIFIED TYPE: ICD-10-CM

## 2025-05-16 LAB
NON-UH HIE BASO COUNT: 0.04 X1000 (ref 0–0.2)
NON-UH HIE BASOS %: 0.9 %
NON-UH HIE DIFF?: ABNORMAL
NON-UH HIE EOS COUNT: 0.15 X1000 (ref 0–0.5)
NON-UH HIE EOSIN %: 3.9 %
NON-UH HIE HCT: 37.7 % (ref 36–46)
NON-UH HIE HGB: 12.7 G/DL (ref 12–16)
NON-UH HIE INSTR WBC: 3.8
NON-UH HIE LYMPH %: 35.1 %
NON-UH HIE LYMPH COUNT: 1.34 X1000 (ref 1.2–4.8)
NON-UH HIE MCH: 31.7 PG (ref 27–34)
NON-UH HIE MCHC: 33.6 G/DL (ref 32–37)
NON-UH HIE MCV: 94.3 FL (ref 80–100)
NON-UH HIE MONO %: 9.5 %
NON-UH HIE MONO COUNT: 0.36 X1000 (ref 0.1–1)
NON-UH HIE MPV: 9.5 FL (ref 7.4–10.4)
NON-UH HIE NEUTROPHIL %: 50.5 %
NON-UH HIE NEUTROPHIL COUNT (ANC): 1.92 X1000 (ref 1.4–8.8)
NON-UH HIE NUCLEATED RBC: 0 /100WBC
NON-UH HIE PLATELET: 248 X10 (ref 150–450)
NON-UH HIE RBC: 4 X10 (ref 4.2–5.4)
NON-UH HIE RDW: 13.5 % (ref 11.5–14.5)
NON-UH HIE WBC: 3.8 X10 (ref 4.5–11)

## 2025-05-20 ENCOUNTER — APPOINTMENT (OUTPATIENT)
Facility: CLINIC | Age: 35
End: 2025-05-20
Payer: COMMERCIAL

## 2025-05-20 VITALS
DIASTOLIC BLOOD PRESSURE: 78 MMHG | HEIGHT: 66 IN | WEIGHT: 123 LBS | BODY MASS INDEX: 19.77 KG/M2 | TEMPERATURE: 97.3 F | SYSTOLIC BLOOD PRESSURE: 118 MMHG

## 2025-05-20 DIAGNOSIS — M54.2 NECK PAIN: ICD-10-CM

## 2025-05-20 PROCEDURE — 1036F TOBACCO NON-USER: CPT | Performed by: OTOLARYNGOLOGY

## 2025-05-20 PROCEDURE — 3008F BODY MASS INDEX DOCD: CPT | Performed by: OTOLARYNGOLOGY

## 2025-05-20 PROCEDURE — 99204 OFFICE O/P NEW MOD 45 MIN: CPT | Performed by: OTOLARYNGOLOGY

## 2025-05-20 NOTE — PROGRESS NOTES
Impression:  Musculoskeletal neck pain with neck stiffness    RECOMMENDATIONS/PLAN :  I reassured the patient that I do not feel any significant lymphadenopathy in her neck and she has not had any concerning symptoms such as unexplained loss of weight night sweats or fatigue.  It appears that she is dealing with significant neck muscle stiffness and she may benefit from some physical therapy or seeing a chiropractor.  In the meantime she will apply some heat and take some anti-inflammatories.  She will let me know if she notices any other swelling in her neck and can follow-up as needed.      **This electronic medical record note was created with the use of voice recognition software.  Despite proofreading, typographical or grammatical errors may be present that could affect meaning of content **    Subjective   Patient ID:     Lorraine Mendenhall is a 34 y.o. female who presents to the office today stating that she thought she had some lymph node swelling along her right neck.  She has been complaining of some neck pain and she states that she does have stiff muscles in her neck and she has poor posture when working at her computer at work.  She denies any secondary symptoms such as unexplained loss of weight night sweats or fatigue.  She denies any trouble swallowing or any fever or chills.  She was given antibiotics recently and she denies any infectious sinus drainage.  No recent upper respiratory complaints or any cold symptoms.    ROS:  A detailed 12 system review of systems is noted on the intake form has been reviewed with the patient with details noted in the HPI and scanned into the patient's medical record.    Objective     Medical History[1]     Surgical History[2]     RX Allergies[3]     Current Medications[4]     Tobacco Use: Low Risk  (5/20/2025)    Patient History     Smoking Tobacco Use: Never     Smokeless Tobacco Use: Never     Passive Exposure: Not on file        Alcohol Use: Not on file     "    Social History     Substance and Sexual Activity   Drug Use Never        Physical Exam:  Visit Vitals  /78   Temp 36.3 °C (97.3 °F) (Temporal)   Ht 1.676 m (5' 6\")   Wt 55.8 kg (123 lb)   BMI 19.85 kg/m²   Smoking Status Never   BSA 1.61 m²      General: Patient is alert, oriented, cooperative in no apparent distress.  Head: Normocephalic, atraumatic.  Eyes: PERRL, EOMI, Conjunctiva is clear. No nystagmus.  Ears: Right Ear-- Pinna is normal.  External auditory canal is patent. Tympanic membrane is [intact, translucent and has good mobility with my pneumatic otoscope. No effusion].  Mastoid is nontender.  Left ear-- Pinna is normal.  External auditory canal is patent. Tympanic membrane is [intact, translucent and has good mobility with my pneumatic otoscope.  No effusion].  Mastoid is nontender.  Nose: Septum is straight.  No septal perforation or lesions. No septal hematoma/ seroma.  No signs of bleeding.  Inferior turbinates are normal.   No evidence of intranasal polyps.  No infectious drainage.  Throat:  Floor of mouth is clear, no masses.  Tongue appears normal, no lesions or masses. Gums, gingiva, buccal mucosa appear pink and moist, no lesions. Teeth are in good repair.  No obvious dental infections.  Peritonsillar regions appear symmetric without swelling.  Hard and soft palate appear normal, no obvious cleft. Uvula is midline.  Oropharynx: No lesions. Retropharyngeal wall is flat.  No active postnasal drip.  Neck: Supple,  no lymphadenopathy.  No masses.  Her trapezius muscle and SCM muscles are quite stiff and tender to palpation with decreased range of motion.  Salivary Glands: Symmetric bilaterally.  No palpable masses.  No evidence of acute infection or salivary stones  Neurologic: Cranial Nerves 2-12 are grossly intact without focal deficits. Cerebellar function testing is normal.     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    Personal history of diseases of the blood and blood-forming organs and certain disorders involving the immune mechanism 09/23/2020    History of anemia   [2]   Past Surgical History:  Procedure Laterality Date    OTHER SURGICAL HISTORY  11/13/2018    Oral surgery    OTHER SURGICAL HISTORY  11/13/2018    Foot surgery    OTHER SURGICAL HISTORY  11/19/2022    Colposcopy   [3]   Allergies  Allergen Reactions    House Dust Other    Pollen Extracts Other    Penicillins Rash   [4]   Current Outpatient Medications:     albuterol 90 mcg/actuation inhaler, Inhale 2 puffs every 6 hours if needed for wheezing., Disp: 18 g, Rfl: 1    ergocalciferol (Vitamin D-2) 1250 mcg (50,000 units) capsule, Take 1 capsule (1.25 mg) by mouth 1 (one) time per week for 15 doses., Disp: 5 capsule, Rfl: 2    ferrous sulfate 325 (65 Fe) mg EC tablet, Take 1 tablet by mouth once daily with breakfast. Do not crush, chew, or split., Disp: , Rfl:     sertraline (Zoloft) 25 mg tablet, Take 1 tablet (25 mg) by mouth once daily., Disp: 90 tablet, Rfl: 4    pantoprazole (ProtoNix) 40 mg EC tablet, Take 1 tablet (40 mg) by mouth once daily. Do not crush, chew, or split. (Patient not taking: Reported on 5/20/2025), Disp: 30 tablet, Rfl: 1

## 2025-05-29 DIAGNOSIS — D64.9 ANEMIA, UNSPECIFIED TYPE: ICD-10-CM

## 2025-06-30 DIAGNOSIS — E55.9 VITAMIN D DEFICIENCY: ICD-10-CM

## 2025-07-04 DIAGNOSIS — K21.9 GASTROESOPHAGEAL REFLUX DISEASE, UNSPECIFIED WHETHER ESOPHAGITIS PRESENT: ICD-10-CM

## 2025-07-07 RX ORDER — PANTOPRAZOLE SODIUM 40 MG/1
40 TABLET, DELAYED RELEASE ORAL DAILY
Qty: 90 TABLET | Refills: 2 | Status: SHIPPED | OUTPATIENT
Start: 2025-07-07

## 2025-08-04 ENCOUNTER — APPOINTMENT (OUTPATIENT)
Dept: GASTROENTEROLOGY | Facility: CLINIC | Age: 35
End: 2025-08-04
Payer: COMMERCIAL

## 2025-08-13 ENCOUNTER — TELEPHONE (OUTPATIENT)
Dept: PRIMARY CARE | Facility: CLINIC | Age: 35
End: 2025-08-13
Payer: COMMERCIAL

## 2025-08-13 DIAGNOSIS — E55.9 VITAMIN D DEFICIENCY: ICD-10-CM

## 2025-08-13 RX ORDER — ERGOCALCIFEROL 1.25 MG/1
1.25 CAPSULE ORAL
Qty: 5 CAPSULE | Refills: 0 | Status: SHIPPED | OUTPATIENT
Start: 2025-08-17 | End: 2025-08-13

## 2025-08-13 RX ORDER — ERGOCALCIFEROL 1.25 MG/1
1.25 CAPSULE ORAL
Qty: 5 CAPSULE | Refills: 0 | Status: SHIPPED | OUTPATIENT
Start: 2025-08-17 | End: 2025-09-15

## 2025-08-18 LAB
NON-UH HIE BASO COUNT: 0.03 X1000 (ref 0–0.2)
NON-UH HIE BASOS %: 0.4 %
NON-UH HIE DIFF?: ABNORMAL
NON-UH HIE EOS COUNT: 0.16 X1000 (ref 0–0.5)
NON-UH HIE EOSIN %: 2.4 %
NON-UH HIE FERRITIN: 26 NG/ML (ref 10–291)
NON-UH HIE HCT: 38.7 % (ref 36–46)
NON-UH HIE HGB: 13.2 G/DL (ref 12–16)
NON-UH HIE INSTR WBC: 6.5
NON-UH HIE LYMPH %: 19.1 %
NON-UH HIE LYMPH COUNT: 1.25 X1000 (ref 1.2–4.8)
NON-UH HIE MCH: 33.2 PG (ref 27–34)
NON-UH HIE MCHC: 34.1 G/DL (ref 32–37)
NON-UH HIE MCV: 97.3 FL (ref 80–100)
NON-UH HIE MONO %: 7.5 %
NON-UH HIE MONO COUNT: 0.49 X1000 (ref 0.1–1)
NON-UH HIE MPV: 9.5 FL (ref 7.4–10.4)
NON-UH HIE NEUTROPHIL %: 70.5 %
NON-UH HIE NEUTROPHIL COUNT (ANC): 4.6 X1000 (ref 1.4–8.8)
NON-UH HIE NUCLEATED RBC: 0 /100WBC
NON-UH HIE PLATELET: 248 X10 (ref 150–450)
NON-UH HIE RBC: 3.98 X10 (ref 4.2–5.4)
NON-UH HIE RDW: 13.1 % (ref 11.5–14.5)
NON-UH HIE VIT D 25: 70 NG/ML
NON-UH HIE WBC: 6.5 X10 (ref 4.5–11)

## 2026-02-02 ENCOUNTER — APPOINTMENT (OUTPATIENT)
Dept: PRIMARY CARE | Facility: CLINIC | Age: 36
End: 2026-02-02
Payer: COMMERCIAL